# Patient Record
Sex: FEMALE | Race: WHITE | NOT HISPANIC OR LATINO | URBAN - METROPOLITAN AREA
[De-identification: names, ages, dates, MRNs, and addresses within clinical notes are randomized per-mention and may not be internally consistent; named-entity substitution may affect disease eponyms.]

---

## 2017-01-20 ENCOUNTER — OUTPATIENT (OUTPATIENT)
Dept: OUTPATIENT SERVICES | Facility: HOSPITAL | Age: 77
LOS: 1 days | Discharge: HOME | End: 2017-01-20

## 2017-05-17 ENCOUNTER — OUTPATIENT (OUTPATIENT)
Dept: OUTPATIENT SERVICES | Facility: HOSPITAL | Age: 77
LOS: 1 days | Discharge: HOME | End: 2017-05-17

## 2017-06-16 ENCOUNTER — EMERGENCY (EMERGENCY)
Facility: HOSPITAL | Age: 77
LOS: 0 days | Discharge: LEFT BEFORE DISCHARGE | End: 2017-06-16

## 2017-06-16 DIAGNOSIS — F33.2 MAJOR DEPRESSIVE DISORDER, RECURRENT SEVERE WITHOUT PSYCHOTIC FEATURES: ICD-10-CM

## 2017-06-16 DIAGNOSIS — M17.10 UNILATERAL PRIMARY OSTEOARTHRITIS, UNSPECIFIED KNEE: ICD-10-CM

## 2017-06-16 DIAGNOSIS — Z90.49 ACQUIRED ABSENCE OF OTHER SPECIFIED PARTS OF DIGESTIVE TRACT: ICD-10-CM

## 2017-06-16 DIAGNOSIS — E78.00 PURE HYPERCHOLESTEROLEMIA, UNSPECIFIED: ICD-10-CM

## 2017-06-16 DIAGNOSIS — Z88.6 ALLERGY STATUS TO ANALGESIC AGENT: ICD-10-CM

## 2017-06-16 DIAGNOSIS — F41.9 ANXIETY DISORDER, UNSPECIFIED: ICD-10-CM

## 2017-06-16 DIAGNOSIS — I10 ESSENTIAL (PRIMARY) HYPERTENSION: ICD-10-CM

## 2017-06-16 DIAGNOSIS — J44.9 CHRONIC OBSTRUCTIVE PULMONARY DISEASE, UNSPECIFIED: ICD-10-CM

## 2017-06-16 DIAGNOSIS — K21.9 GASTRO-ESOPHAGEAL REFLUX DISEASE WITHOUT ESOPHAGITIS: ICD-10-CM

## 2017-06-16 DIAGNOSIS — R25.1 TREMOR, UNSPECIFIED: ICD-10-CM

## 2017-06-16 DIAGNOSIS — F41.0 PANIC DISORDER [EPISODIC PAROXYSMAL ANXIETY]: ICD-10-CM

## 2017-06-16 DIAGNOSIS — F32.9 MAJOR DEPRESSIVE DISORDER, SINGLE EPISODE, UNSPECIFIED: ICD-10-CM

## 2017-06-16 DIAGNOSIS — Z88.8 ALLERGY STATUS TO OTHER DRUGS, MEDICAMENTS AND BIOLOGICAL SUBSTANCES STATUS: ICD-10-CM

## 2017-06-16 DIAGNOSIS — R07.9 CHEST PAIN, UNSPECIFIED: ICD-10-CM

## 2017-06-16 DIAGNOSIS — G30.9 ALZHEIMER'S DISEASE, UNSPECIFIED: ICD-10-CM

## 2017-06-16 DIAGNOSIS — Z88.0 ALLERGY STATUS TO PENICILLIN: ICD-10-CM

## 2017-06-16 DIAGNOSIS — Z79.899 OTHER LONG TERM (CURRENT) DRUG THERAPY: ICD-10-CM

## 2017-06-28 ENCOUNTER — OUTPATIENT (OUTPATIENT)
Dept: OUTPATIENT SERVICES | Facility: HOSPITAL | Age: 77
LOS: 1 days | Discharge: HOME | End: 2017-06-28

## 2017-06-28 DIAGNOSIS — R07.9 CHEST PAIN, UNSPECIFIED: ICD-10-CM

## 2017-06-28 DIAGNOSIS — F33.1 MAJOR DEPRESSIVE DISORDER, RECURRENT, MODERATE: ICD-10-CM

## 2017-06-28 DIAGNOSIS — M17.10 UNILATERAL PRIMARY OSTEOARTHRITIS, UNSPECIFIED KNEE: ICD-10-CM

## 2017-06-28 DIAGNOSIS — I10 ESSENTIAL (PRIMARY) HYPERTENSION: ICD-10-CM

## 2017-06-28 DIAGNOSIS — K21.9 GASTRO-ESOPHAGEAL REFLUX DISEASE WITHOUT ESOPHAGITIS: ICD-10-CM

## 2017-06-28 DIAGNOSIS — F41.0 PANIC DISORDER [EPISODIC PAROXYSMAL ANXIETY]: ICD-10-CM

## 2017-06-28 DIAGNOSIS — G30.9 ALZHEIMER'S DISEASE, UNSPECIFIED: ICD-10-CM

## 2017-06-28 DIAGNOSIS — F33.2 MAJOR DEPRESSIVE DISORDER, RECURRENT SEVERE WITHOUT PSYCHOTIC FEATURES: ICD-10-CM

## 2017-07-07 ENCOUNTER — EMERGENCY (EMERGENCY)
Facility: HOSPITAL | Age: 77
LOS: 0 days | Discharge: HOME | End: 2017-07-07

## 2017-07-07 DIAGNOSIS — F41.0 PANIC DISORDER [EPISODIC PAROXYSMAL ANXIETY]: ICD-10-CM

## 2017-07-07 DIAGNOSIS — Z96.659 PRESENCE OF UNSPECIFIED ARTIFICIAL KNEE JOINT: ICD-10-CM

## 2017-07-07 DIAGNOSIS — I10 ESSENTIAL (PRIMARY) HYPERTENSION: ICD-10-CM

## 2017-07-07 DIAGNOSIS — R07.2 PRECORDIAL PAIN: ICD-10-CM

## 2017-07-07 DIAGNOSIS — R10.13 EPIGASTRIC PAIN: ICD-10-CM

## 2017-07-07 DIAGNOSIS — G30.9 ALZHEIMER'S DISEASE, UNSPECIFIED: ICD-10-CM

## 2017-07-07 DIAGNOSIS — E78.00 PURE HYPERCHOLESTEROLEMIA, UNSPECIFIED: ICD-10-CM

## 2017-07-07 DIAGNOSIS — F03.90 UNSPECIFIED DEMENTIA, UNSPECIFIED SEVERITY, WITHOUT BEHAVIORAL DISTURBANCE, PSYCHOTIC DISTURBANCE, MOOD DISTURBANCE, AND ANXIETY: ICD-10-CM

## 2017-07-07 DIAGNOSIS — Z88.0 ALLERGY STATUS TO PENICILLIN: ICD-10-CM

## 2017-07-07 DIAGNOSIS — Z90.89 ACQUIRED ABSENCE OF OTHER ORGANS: ICD-10-CM

## 2017-07-07 DIAGNOSIS — Z88.6 ALLERGY STATUS TO ANALGESIC AGENT: ICD-10-CM

## 2017-07-07 DIAGNOSIS — M17.10 UNILATERAL PRIMARY OSTEOARTHRITIS, UNSPECIFIED KNEE: ICD-10-CM

## 2017-07-07 DIAGNOSIS — Z88.5 ALLERGY STATUS TO NARCOTIC AGENT: ICD-10-CM

## 2017-07-07 DIAGNOSIS — R07.9 CHEST PAIN, UNSPECIFIED: ICD-10-CM

## 2017-07-07 DIAGNOSIS — F33.2 MAJOR DEPRESSIVE DISORDER, RECURRENT SEVERE WITHOUT PSYCHOTIC FEATURES: ICD-10-CM

## 2017-07-07 DIAGNOSIS — J44.9 CHRONIC OBSTRUCTIVE PULMONARY DISEASE, UNSPECIFIED: ICD-10-CM

## 2017-07-07 DIAGNOSIS — Z79.899 OTHER LONG TERM (CURRENT) DRUG THERAPY: ICD-10-CM

## 2017-07-07 DIAGNOSIS — K21.9 GASTRO-ESOPHAGEAL REFLUX DISEASE WITHOUT ESOPHAGITIS: ICD-10-CM

## 2017-08-11 ENCOUNTER — OUTPATIENT (OUTPATIENT)
Dept: OUTPATIENT SERVICES | Facility: HOSPITAL | Age: 77
LOS: 1 days | Discharge: HOME | End: 2017-08-11

## 2017-08-11 DIAGNOSIS — F33.1 MAJOR DEPRESSIVE DISORDER, RECURRENT, MODERATE: ICD-10-CM

## 2017-08-14 ENCOUNTER — OUTPATIENT (OUTPATIENT)
Dept: OUTPATIENT SERVICES | Facility: HOSPITAL | Age: 77
LOS: 1 days | Discharge: HOME | End: 2017-08-14

## 2017-08-14 DIAGNOSIS — N31.2 FLACCID NEUROPATHIC BLADDER, NOT ELSEWHERE CLASSIFIED: ICD-10-CM

## 2017-08-14 DIAGNOSIS — F33.2 MAJOR DEPRESSIVE DISORDER, RECURRENT SEVERE WITHOUT PSYCHOTIC FEATURES: ICD-10-CM

## 2017-08-14 DIAGNOSIS — I10 ESSENTIAL (PRIMARY) HYPERTENSION: ICD-10-CM

## 2017-08-14 DIAGNOSIS — M17.10 UNILATERAL PRIMARY OSTEOARTHRITIS, UNSPECIFIED KNEE: ICD-10-CM

## 2017-08-14 DIAGNOSIS — K21.9 GASTRO-ESOPHAGEAL REFLUX DISEASE WITHOUT ESOPHAGITIS: ICD-10-CM

## 2017-08-14 DIAGNOSIS — R07.9 CHEST PAIN, UNSPECIFIED: ICD-10-CM

## 2017-08-14 DIAGNOSIS — G30.9 ALZHEIMER'S DISEASE, UNSPECIFIED: ICD-10-CM

## 2017-08-14 DIAGNOSIS — F41.0 PANIC DISORDER [EPISODIC PAROXYSMAL ANXIETY]: ICD-10-CM

## 2017-08-14 DIAGNOSIS — N39.0 URINARY TRACT INFECTION, SITE NOT SPECIFIED: ICD-10-CM

## 2017-08-14 DIAGNOSIS — M54.5 LOW BACK PAIN: ICD-10-CM

## 2017-08-21 ENCOUNTER — OUTPATIENT (OUTPATIENT)
Dept: OUTPATIENT SERVICES | Facility: HOSPITAL | Age: 77
LOS: 1 days | Discharge: HOME | End: 2017-08-21

## 2017-08-21 DIAGNOSIS — I10 ESSENTIAL (PRIMARY) HYPERTENSION: ICD-10-CM

## 2017-08-21 DIAGNOSIS — K21.9 GASTRO-ESOPHAGEAL REFLUX DISEASE WITHOUT ESOPHAGITIS: ICD-10-CM

## 2017-08-21 DIAGNOSIS — F41.0 PANIC DISORDER [EPISODIC PAROXYSMAL ANXIETY]: ICD-10-CM

## 2017-08-21 DIAGNOSIS — R07.9 CHEST PAIN, UNSPECIFIED: ICD-10-CM

## 2017-08-21 DIAGNOSIS — H90.3 SENSORINEURAL HEARING LOSS, BILATERAL: ICD-10-CM

## 2017-08-21 DIAGNOSIS — H93.11 TINNITUS, RIGHT EAR: ICD-10-CM

## 2017-08-21 DIAGNOSIS — M17.10 UNILATERAL PRIMARY OSTEOARTHRITIS, UNSPECIFIED KNEE: ICD-10-CM

## 2017-08-21 DIAGNOSIS — F33.2 MAJOR DEPRESSIVE DISORDER, RECURRENT SEVERE WITHOUT PSYCHOTIC FEATURES: ICD-10-CM

## 2017-08-21 DIAGNOSIS — G30.9 ALZHEIMER'S DISEASE, UNSPECIFIED: ICD-10-CM

## 2017-09-03 ENCOUNTER — EMERGENCY (EMERGENCY)
Facility: HOSPITAL | Age: 77
LOS: 0 days | Discharge: HOME | End: 2017-09-03

## 2017-09-03 DIAGNOSIS — F41.9 ANXIETY DISORDER, UNSPECIFIED: ICD-10-CM

## 2017-09-03 DIAGNOSIS — M25.551 PAIN IN RIGHT HIP: ICD-10-CM

## 2017-09-03 DIAGNOSIS — K21.9 GASTRO-ESOPHAGEAL REFLUX DISEASE WITHOUT ESOPHAGITIS: ICD-10-CM

## 2017-09-03 DIAGNOSIS — E78.00 PURE HYPERCHOLESTEROLEMIA, UNSPECIFIED: ICD-10-CM

## 2017-09-03 DIAGNOSIS — F33.2 MAJOR DEPRESSIVE DISORDER, RECURRENT SEVERE WITHOUT PSYCHOTIC FEATURES: ICD-10-CM

## 2017-09-03 DIAGNOSIS — G30.9 ALZHEIMER'S DISEASE, UNSPECIFIED: ICD-10-CM
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
## 2017-09-03 DIAGNOSIS — I10 ESSENTIAL (PRIMARY) HYPERTENSION: ICD-10-CM

## 2017-09-03 DIAGNOSIS — R07.89 OTHER CHEST PAIN: ICD-10-CM

## 2017-09-03 DIAGNOSIS — M17.10 UNILATERAL PRIMARY OSTEOARTHRITIS, UNSPECIFIED KNEE: ICD-10-CM

## 2017-09-03 DIAGNOSIS — M54.2 CERVICALGIA: ICD-10-CM

## 2017-09-03 DIAGNOSIS — R07.9 CHEST PAIN, UNSPECIFIED: ICD-10-CM
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
## 2017-09-03 DIAGNOSIS — J44.9 CHRONIC OBSTRUCTIVE PULMONARY DISEASE, UNSPECIFIED: ICD-10-CM

## 2017-09-03 DIAGNOSIS — F41.0 PANIC DISORDER [EPISODIC PAROXYSMAL ANXIETY]: ICD-10-CM
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
## 2017-09-03 DIAGNOSIS — F03.90 UNSPECIFIED DEMENTIA, UNSPECIFIED SEVERITY, WITHOUT BEHAVIORAL DISTURBANCE, PSYCHOTIC DISTURBANCE, MOOD DISTURBANCE, AND ANXIETY: ICD-10-CM

## 2017-09-03 DIAGNOSIS — M25.511 PAIN IN RIGHT SHOULDER: ICD-10-CM

## 2017-10-13 ENCOUNTER — EMERGENCY (EMERGENCY)
Facility: HOSPITAL | Age: 77
LOS: 0 days | Discharge: HOME | End: 2017-10-13

## 2017-10-13 DIAGNOSIS — G30.9 ALZHEIMER'S DISEASE, UNSPECIFIED: ICD-10-CM

## 2017-10-13 DIAGNOSIS — E78.00 PURE HYPERCHOLESTEROLEMIA, UNSPECIFIED: ICD-10-CM

## 2017-10-13 DIAGNOSIS — R06.02 SHORTNESS OF BREATH: ICD-10-CM

## 2017-10-13 DIAGNOSIS — F33.2 MAJOR DEPRESSIVE DISORDER, RECURRENT SEVERE WITHOUT PSYCHOTIC FEATURES: ICD-10-CM

## 2017-10-13 DIAGNOSIS — I10 ESSENTIAL (PRIMARY) HYPERTENSION: ICD-10-CM

## 2017-10-13 DIAGNOSIS — K21.9 GASTRO-ESOPHAGEAL REFLUX DISEASE WITHOUT ESOPHAGITIS: ICD-10-CM

## 2017-10-13 DIAGNOSIS — R07.9 CHEST PAIN, UNSPECIFIED: ICD-10-CM

## 2017-10-13 DIAGNOSIS — F41.0 PANIC DISORDER [EPISODIC PAROXYSMAL ANXIETY]: ICD-10-CM

## 2017-10-13 DIAGNOSIS — Z88.0 ALLERGY STATUS TO PENICILLIN: ICD-10-CM

## 2017-10-13 DIAGNOSIS — Z79.899 OTHER LONG TERM (CURRENT) DRUG THERAPY: ICD-10-CM

## 2017-10-13 DIAGNOSIS — Z88.6 ALLERGY STATUS TO ANALGESIC AGENT: ICD-10-CM

## 2017-10-13 DIAGNOSIS — Z90.49 ACQUIRED ABSENCE OF OTHER SPECIFIED PARTS OF DIGESTIVE TRACT: ICD-10-CM

## 2017-10-13 DIAGNOSIS — J44.9 CHRONIC OBSTRUCTIVE PULMONARY DISEASE, UNSPECIFIED: ICD-10-CM

## 2017-10-13 DIAGNOSIS — F32.9 MAJOR DEPRESSIVE DISORDER, SINGLE EPISODE, UNSPECIFIED: ICD-10-CM

## 2017-10-13 DIAGNOSIS — K20.9 ESOPHAGITIS, UNSPECIFIED: ICD-10-CM

## 2017-10-13 DIAGNOSIS — Z88.5 ALLERGY STATUS TO NARCOTIC AGENT: ICD-10-CM

## 2017-10-13 DIAGNOSIS — R07.81 PLEURODYNIA: ICD-10-CM

## 2017-10-13 DIAGNOSIS — M17.10 UNILATERAL PRIMARY OSTEOARTHRITIS, UNSPECIFIED KNEE: ICD-10-CM

## 2017-11-15 ENCOUNTER — OUTPATIENT (OUTPATIENT)
Dept: OUTPATIENT SERVICES | Facility: HOSPITAL | Age: 77
LOS: 1 days | Discharge: HOME | End: 2017-11-15

## 2017-11-15 DIAGNOSIS — I10 ESSENTIAL (PRIMARY) HYPERTENSION: ICD-10-CM

## 2017-11-15 DIAGNOSIS — E78.5 HYPERLIPIDEMIA, UNSPECIFIED: ICD-10-CM

## 2017-11-15 DIAGNOSIS — E07.9 DISORDER OF THYROID, UNSPECIFIED: ICD-10-CM

## 2017-11-15 DIAGNOSIS — R94.5 ABNORMAL RESULTS OF LIVER FUNCTION STUDIES: ICD-10-CM

## 2017-11-15 DIAGNOSIS — E11.00 TYPE 2 DIABETES MELLITUS WITH HYPEROSMOLARITY WITHOUT NONKETOTIC HYPERGLYCEMIC-HYPEROSMOLAR COMA (NKHHC): ICD-10-CM

## 2017-11-15 DIAGNOSIS — G30.9 ALZHEIMER'S DISEASE, UNSPECIFIED: ICD-10-CM

## 2017-11-15 DIAGNOSIS — R07.9 CHEST PAIN, UNSPECIFIED: ICD-10-CM

## 2017-11-15 DIAGNOSIS — K21.9 GASTRO-ESOPHAGEAL REFLUX DISEASE WITHOUT ESOPHAGITIS: ICD-10-CM

## 2017-11-15 DIAGNOSIS — F41.0 PANIC DISORDER [EPISODIC PAROXYSMAL ANXIETY]: ICD-10-CM

## 2017-11-15 DIAGNOSIS — R31.9 HEMATURIA, UNSPECIFIED: ICD-10-CM

## 2017-11-15 DIAGNOSIS — D50.9 IRON DEFICIENCY ANEMIA, UNSPECIFIED: ICD-10-CM

## 2017-11-15 DIAGNOSIS — E03.9 HYPOTHYROIDISM, UNSPECIFIED: ICD-10-CM

## 2017-11-15 DIAGNOSIS — F33.2 MAJOR DEPRESSIVE DISORDER, RECURRENT SEVERE WITHOUT PSYCHOTIC FEATURES: ICD-10-CM

## 2017-11-15 DIAGNOSIS — R70.0 ELEVATED ERYTHROCYTE SEDIMENTATION RATE: ICD-10-CM

## 2017-11-15 DIAGNOSIS — D64.3 OTHER SIDEROBLASTIC ANEMIAS: ICD-10-CM

## 2017-11-15 DIAGNOSIS — R19.8 OTHER SPECIFIED SYMPTOMS AND SIGNS INVOLVING THE DIGESTIVE SYSTEM AND ABDOMEN: ICD-10-CM

## 2017-11-15 DIAGNOSIS — M17.10 UNILATERAL PRIMARY OSTEOARTHRITIS, UNSPECIFIED KNEE: ICD-10-CM

## 2017-11-15 DIAGNOSIS — E55.9 VITAMIN D DEFICIENCY, UNSPECIFIED: ICD-10-CM

## 2017-11-15 DIAGNOSIS — D51.9 VITAMIN B12 DEFICIENCY ANEMIA, UNSPECIFIED: ICD-10-CM

## 2017-11-18 ENCOUNTER — OUTPATIENT (OUTPATIENT)
Dept: OUTPATIENT SERVICES | Facility: HOSPITAL | Age: 77
LOS: 1 days | Discharge: HOME | End: 2017-11-18

## 2017-11-18 DIAGNOSIS — M54.9 DORSALGIA, UNSPECIFIED: ICD-10-CM

## 2017-11-18 DIAGNOSIS — K21.9 GASTRO-ESOPHAGEAL REFLUX DISEASE WITHOUT ESOPHAGITIS: ICD-10-CM

## 2017-11-18 DIAGNOSIS — F33.2 MAJOR DEPRESSIVE DISORDER, RECURRENT SEVERE WITHOUT PSYCHOTIC FEATURES: ICD-10-CM

## 2017-11-18 DIAGNOSIS — M17.10 UNILATERAL PRIMARY OSTEOARTHRITIS, UNSPECIFIED KNEE: ICD-10-CM

## 2017-11-18 DIAGNOSIS — G30.9 ALZHEIMER'S DISEASE, UNSPECIFIED: ICD-10-CM

## 2017-11-18 DIAGNOSIS — I10 ESSENTIAL (PRIMARY) HYPERTENSION: ICD-10-CM

## 2017-11-18 DIAGNOSIS — F41.0 PANIC DISORDER [EPISODIC PAROXYSMAL ANXIETY]: ICD-10-CM

## 2017-11-18 DIAGNOSIS — R07.9 CHEST PAIN, UNSPECIFIED: ICD-10-CM

## 2017-11-19 ENCOUNTER — OUTPATIENT (OUTPATIENT)
Dept: OUTPATIENT SERVICES | Facility: HOSPITAL | Age: 77
LOS: 1 days | Discharge: HOME | End: 2017-11-19

## 2017-11-19 DIAGNOSIS — G30.9 ALZHEIMER'S DISEASE, UNSPECIFIED: ICD-10-CM

## 2017-11-19 DIAGNOSIS — R10.9 UNSPECIFIED ABDOMINAL PAIN: ICD-10-CM

## 2017-11-19 DIAGNOSIS — K21.9 GASTRO-ESOPHAGEAL REFLUX DISEASE WITHOUT ESOPHAGITIS: ICD-10-CM

## 2017-11-19 DIAGNOSIS — F41.0 PANIC DISORDER [EPISODIC PAROXYSMAL ANXIETY]: ICD-10-CM

## 2017-11-19 DIAGNOSIS — F33.2 MAJOR DEPRESSIVE DISORDER, RECURRENT SEVERE WITHOUT PSYCHOTIC FEATURES: ICD-10-CM

## 2017-11-19 DIAGNOSIS — R10.2 PELVIC AND PERINEAL PAIN: ICD-10-CM

## 2017-11-19 DIAGNOSIS — M17.10 UNILATERAL PRIMARY OSTEOARTHRITIS, UNSPECIFIED KNEE: ICD-10-CM

## 2017-11-19 DIAGNOSIS — I10 ESSENTIAL (PRIMARY) HYPERTENSION: ICD-10-CM

## 2017-11-19 DIAGNOSIS — R07.9 CHEST PAIN, UNSPECIFIED: ICD-10-CM

## 2017-11-26 ENCOUNTER — EMERGENCY (EMERGENCY)
Facility: HOSPITAL | Age: 77
LOS: 0 days | Discharge: LEFT BEFORE DISCHARGE | End: 2017-11-26

## 2017-11-26 DIAGNOSIS — F32.9 MAJOR DEPRESSIVE DISORDER, SINGLE EPISODE, UNSPECIFIED: ICD-10-CM

## 2017-11-26 DIAGNOSIS — I10 ESSENTIAL (PRIMARY) HYPERTENSION: ICD-10-CM

## 2017-11-26 DIAGNOSIS — R10.32 LEFT LOWER QUADRANT PAIN: ICD-10-CM

## 2017-11-26 DIAGNOSIS — R07.9 CHEST PAIN, UNSPECIFIED: ICD-10-CM

## 2017-11-26 DIAGNOSIS — Z88.6 ALLERGY STATUS TO ANALGESIC AGENT: ICD-10-CM

## 2017-11-26 DIAGNOSIS — M17.10 UNILATERAL PRIMARY OSTEOARTHRITIS, UNSPECIFIED KNEE: ICD-10-CM

## 2017-11-26 DIAGNOSIS — G30.9 ALZHEIMER'S DISEASE, UNSPECIFIED: ICD-10-CM

## 2017-11-26 DIAGNOSIS — F41.0 PANIC DISORDER [EPISODIC PAROXYSMAL ANXIETY]: ICD-10-CM

## 2017-11-26 DIAGNOSIS — Z90.49 ACQUIRED ABSENCE OF OTHER SPECIFIED PARTS OF DIGESTIVE TRACT: ICD-10-CM

## 2017-11-26 DIAGNOSIS — F33.2 MAJOR DEPRESSIVE DISORDER, RECURRENT SEVERE WITHOUT PSYCHOTIC FEATURES: ICD-10-CM

## 2017-11-26 DIAGNOSIS — Z79.899 OTHER LONG TERM (CURRENT) DRUG THERAPY: ICD-10-CM

## 2017-11-26 DIAGNOSIS — E78.00 PURE HYPERCHOLESTEROLEMIA, UNSPECIFIED: ICD-10-CM

## 2017-11-26 DIAGNOSIS — K21.9 GASTRO-ESOPHAGEAL REFLUX DISEASE WITHOUT ESOPHAGITIS: ICD-10-CM

## 2017-11-26 DIAGNOSIS — J44.9 CHRONIC OBSTRUCTIVE PULMONARY DISEASE, UNSPECIFIED: ICD-10-CM

## 2017-11-26 DIAGNOSIS — R10.30 LOWER ABDOMINAL PAIN, UNSPECIFIED: ICD-10-CM

## 2017-11-26 DIAGNOSIS — Z88.0 ALLERGY STATUS TO PENICILLIN: ICD-10-CM

## 2017-11-26 DIAGNOSIS — Z88.8 ALLERGY STATUS TO OTHER DRUGS, MEDICAMENTS AND BIOLOGICAL SUBSTANCES: ICD-10-CM

## 2018-01-10 ENCOUNTER — OUTPATIENT (OUTPATIENT)
Dept: OUTPATIENT SERVICES | Facility: HOSPITAL | Age: 78
LOS: 1 days | Discharge: HOME | End: 2018-01-10

## 2018-01-10 DIAGNOSIS — G30.9 ALZHEIMER'S DISEASE, UNSPECIFIED: ICD-10-CM

## 2018-01-10 DIAGNOSIS — F41.0 PANIC DISORDER [EPISODIC PAROXYSMAL ANXIETY]: ICD-10-CM

## 2018-01-10 DIAGNOSIS — K21.9 GASTRO-ESOPHAGEAL REFLUX DISEASE WITHOUT ESOPHAGITIS: ICD-10-CM

## 2018-01-10 DIAGNOSIS — R10.9 UNSPECIFIED ABDOMINAL PAIN: ICD-10-CM

## 2018-01-10 DIAGNOSIS — I10 ESSENTIAL (PRIMARY) HYPERTENSION: ICD-10-CM

## 2018-01-10 DIAGNOSIS — M17.10 UNILATERAL PRIMARY OSTEOARTHRITIS, UNSPECIFIED KNEE: ICD-10-CM

## 2018-01-10 DIAGNOSIS — F33.2 MAJOR DEPRESSIVE DISORDER, RECURRENT SEVERE WITHOUT PSYCHOTIC FEATURES: ICD-10-CM

## 2018-01-10 DIAGNOSIS — R07.9 CHEST PAIN, UNSPECIFIED: ICD-10-CM

## 2018-01-13 ENCOUNTER — OUTPATIENT (OUTPATIENT)
Dept: OUTPATIENT SERVICES | Facility: HOSPITAL | Age: 78
LOS: 1 days | Discharge: HOME | End: 2018-01-13

## 2018-01-13 DIAGNOSIS — K21.9 GASTRO-ESOPHAGEAL REFLUX DISEASE WITHOUT ESOPHAGITIS: ICD-10-CM

## 2018-01-13 DIAGNOSIS — R07.9 CHEST PAIN, UNSPECIFIED: ICD-10-CM

## 2018-01-13 DIAGNOSIS — F33.2 MAJOR DEPRESSIVE DISORDER, RECURRENT SEVERE WITHOUT PSYCHOTIC FEATURES: ICD-10-CM

## 2018-01-13 DIAGNOSIS — G30.9 ALZHEIMER'S DISEASE, UNSPECIFIED: ICD-10-CM

## 2018-01-13 DIAGNOSIS — R10.10 UPPER ABDOMINAL PAIN, UNSPECIFIED: ICD-10-CM

## 2018-01-13 DIAGNOSIS — M17.10 UNILATERAL PRIMARY OSTEOARTHRITIS, UNSPECIFIED KNEE: ICD-10-CM

## 2018-01-13 DIAGNOSIS — I10 ESSENTIAL (PRIMARY) HYPERTENSION: ICD-10-CM

## 2018-01-13 DIAGNOSIS — F41.0 PANIC DISORDER [EPISODIC PAROXYSMAL ANXIETY]: ICD-10-CM

## 2018-02-06 ENCOUNTER — EMERGENCY (EMERGENCY)
Facility: HOSPITAL | Age: 78
LOS: 0 days | Discharge: HOME | End: 2018-02-06

## 2018-02-06 DIAGNOSIS — Z53.21 PROCEDURE AND TREATMENT NOT CARRIED OUT DUE TO PATIENT LEAVING PRIOR TO BEING SEEN BY HEALTH CARE PROVIDER: ICD-10-CM

## 2018-03-10 ENCOUNTER — EMERGENCY (EMERGENCY)
Facility: HOSPITAL | Age: 78
LOS: 0 days | Discharge: HOME | End: 2018-03-10
Attending: STUDENT IN AN ORGANIZED HEALTH CARE EDUCATION/TRAINING PROGRAM

## 2018-03-10 VITALS
OXYGEN SATURATION: 99 % | RESPIRATION RATE: 17 BRPM | SYSTOLIC BLOOD PRESSURE: 158 MMHG | DIASTOLIC BLOOD PRESSURE: 79 MMHG | HEART RATE: 106 BPM | TEMPERATURE: 98 F

## 2018-03-10 VITALS
HEART RATE: 111 BPM | RESPIRATION RATE: 18 BRPM | DIASTOLIC BLOOD PRESSURE: 84 MMHG | SYSTOLIC BLOOD PRESSURE: 145 MMHG | TEMPERATURE: 98 F | OXYGEN SATURATION: 99 %

## 2018-03-10 DIAGNOSIS — Z88.6 ALLERGY STATUS TO ANALGESIC AGENT: ICD-10-CM

## 2018-03-10 DIAGNOSIS — F03.90 UNSPECIFIED DEMENTIA WITHOUT BEHAVIORAL DISTURBANCE: ICD-10-CM

## 2018-03-10 DIAGNOSIS — Z88.0 ALLERGY STATUS TO PENICILLIN: ICD-10-CM

## 2018-03-10 DIAGNOSIS — K57.92 DIVERTICULITIS OF INTESTINE, PART UNSPECIFIED, WITHOUT PERFORATION OR ABSCESS WITHOUT BLEEDING: Chronic | ICD-10-CM

## 2018-03-10 DIAGNOSIS — E78.00 PURE HYPERCHOLESTEROLEMIA, UNSPECIFIED: ICD-10-CM

## 2018-03-10 DIAGNOSIS — K21.9 GASTRO-ESOPHAGEAL REFLUX DISEASE WITHOUT ESOPHAGITIS: ICD-10-CM

## 2018-03-10 DIAGNOSIS — Z79.899 OTHER LONG TERM (CURRENT) DRUG THERAPY: ICD-10-CM

## 2018-03-10 DIAGNOSIS — G30.0 ALZHEIMER'S DISEASE WITH EARLY ONSET: ICD-10-CM

## 2018-03-10 DIAGNOSIS — F32.9 MAJOR DEPRESSIVE DISORDER, SINGLE EPISODE, UNSPECIFIED: ICD-10-CM

## 2018-03-10 DIAGNOSIS — Z96.659 PRESENCE OF UNSPECIFIED ARTIFICIAL KNEE JOINT: ICD-10-CM

## 2018-03-10 DIAGNOSIS — I10 ESSENTIAL (PRIMARY) HYPERTENSION: ICD-10-CM

## 2018-03-10 DIAGNOSIS — T84.82XA FIBROSIS DUE TO INTERNAL ORTHOPEDIC PROSTHETIC DEVICES, IMPLANTS AND GRAFTS, INITIAL ENCOUNTER: Chronic | ICD-10-CM

## 2018-03-10 DIAGNOSIS — R46.2 STRANGE AND INEXPLICABLE BEHAVIOR: ICD-10-CM

## 2018-03-10 LAB
ALBUMIN SERPL ELPH-MCNC: 4.6 G/DL — SIGNIFICANT CHANGE UP (ref 3–5.5)
ALP SERPL-CCNC: 79 U/L — SIGNIFICANT CHANGE UP (ref 30–115)
ALT FLD-CCNC: 13 U/L — SIGNIFICANT CHANGE UP (ref 0–41)
ANION GAP SERPL CALC-SCNC: 9 MMOL/L — SIGNIFICANT CHANGE UP (ref 7–14)
APAP SERPL-MCNC: <10 UG/ML — SIGNIFICANT CHANGE UP (ref 10–30)
APPEARANCE UR: CLEAR — SIGNIFICANT CHANGE UP
AST SERPL-CCNC: 19 U/L — SIGNIFICANT CHANGE UP (ref 0–41)
BACTERIA # UR AUTO: (no result)
BASOPHILS # BLD AUTO: 0.03 K/UL — SIGNIFICANT CHANGE UP (ref 0–0.2)
BASOPHILS NFR BLD AUTO: 0.9 % — SIGNIFICANT CHANGE UP (ref 0–1)
BILIRUB SERPL-MCNC: 0.6 MG/DL — SIGNIFICANT CHANGE UP (ref 0.2–1.2)
BILIRUB UR-MCNC: NEGATIVE — SIGNIFICANT CHANGE UP
BUN SERPL-MCNC: 17 MG/DL — SIGNIFICANT CHANGE UP (ref 10–20)
CALCIUM SERPL-MCNC: 10.2 MG/DL — HIGH (ref 8.5–10.1)
CHLORIDE SERPL-SCNC: 103 MMOL/L — SIGNIFICANT CHANGE UP (ref 98–110)
CK MB BLD-MCNC: 2 % — SIGNIFICANT CHANGE UP (ref 0–4)
CK MB CFR SERPL CALC: 1.4 NG/ML — SIGNIFICANT CHANGE UP (ref 0.6–6.3)
CK SERPL-CCNC: 65 U/L — SIGNIFICANT CHANGE UP (ref 0–225)
CO2 SERPL-SCNC: 28 MMOL/L — SIGNIFICANT CHANGE UP (ref 17–32)
COLOR SPEC: YELLOW — SIGNIFICANT CHANGE UP
CREAT SERPL-MCNC: 0.8 MG/DL — SIGNIFICANT CHANGE UP (ref 0.7–1.5)
DIFF PNL FLD: (no result)
EOSINOPHIL # BLD AUTO: 0.03 K/UL — SIGNIFICANT CHANGE UP (ref 0–0.7)
EOSINOPHIL NFR BLD AUTO: 0.9 % — SIGNIFICANT CHANGE UP (ref 0–8)
EPI CELLS # UR: (no result) /HPF
ETHANOL SERPL-MCNC: <5 MG/DL — SIGNIFICANT CHANGE UP
GLUCOSE SERPL-MCNC: 111 MG/DL — HIGH (ref 70–110)
GLUCOSE UR QL: NEGATIVE MG/DL — SIGNIFICANT CHANGE UP
HCT VFR BLD CALC: 42.4 % — SIGNIFICANT CHANGE UP (ref 37–47)
HGB BLD-MCNC: 13.3 G/DL — SIGNIFICANT CHANGE UP (ref 12–16)
IMM GRANULOCYTES NFR BLD AUTO: 0.3 % — SIGNIFICANT CHANGE UP (ref 0.1–0.3)
KETONES UR-MCNC: (no result)
LEUKOCYTE ESTERASE UR-ACNC: (no result)
LIDOCAIN IGE QN: 34 U/L — SIGNIFICANT CHANGE UP (ref 7–60)
LYMPHOCYTES # BLD AUTO: 0.73 K/UL — LOW (ref 1.2–3.4)
LYMPHOCYTES # BLD AUTO: 20.8 % — SIGNIFICANT CHANGE UP (ref 20.5–51.1)
MCHC RBC-ENTMCNC: 27.9 PG — SIGNIFICANT CHANGE UP (ref 27–31)
MCHC RBC-ENTMCNC: 31.4 G/DL — LOW (ref 32–37)
MCV RBC AUTO: 89.1 FL — SIGNIFICANT CHANGE UP (ref 81–99)
MONOCYTES # BLD AUTO: 0.37 K/UL — SIGNIFICANT CHANGE UP (ref 0.1–0.6)
MONOCYTES NFR BLD AUTO: 10.5 % — HIGH (ref 1.7–9.3)
NEUTROPHILS # BLD AUTO: 2.34 K/UL — SIGNIFICANT CHANGE UP (ref 1.4–6.5)
NEUTROPHILS NFR BLD AUTO: 66.6 % — SIGNIFICANT CHANGE UP (ref 42.2–75.2)
NITRITE UR-MCNC: NEGATIVE — SIGNIFICANT CHANGE UP
NRBC # BLD: 0 /100 WBCS — SIGNIFICANT CHANGE UP (ref 0–0)
PH UR: 5.5 — SIGNIFICANT CHANGE UP (ref 5–8)
PLATELET # BLD AUTO: 313 K/UL — SIGNIFICANT CHANGE UP (ref 130–400)
POTASSIUM SERPL-MCNC: 4 MMOL/L — SIGNIFICANT CHANGE UP (ref 3.5–5)
POTASSIUM SERPL-SCNC: 4 MMOL/L — SIGNIFICANT CHANGE UP (ref 3.5–5)
PROT SERPL-MCNC: 7.9 G/DL — SIGNIFICANT CHANGE UP (ref 6–8)
PROT UR-MCNC: 30 MG/DL
RBC # BLD: 4.76 M/UL — SIGNIFICANT CHANGE UP (ref 4.2–5.4)
RBC # FLD: 14.4 % — SIGNIFICANT CHANGE UP (ref 11.5–14.5)
RBC CASTS # UR COMP ASSIST: (no result) /HPF
SALICYLATES SERPL-MCNC: <4 MG/DL — SIGNIFICANT CHANGE UP (ref 4–30)
SODIUM SERPL-SCNC: 140 MMOL/L — SIGNIFICANT CHANGE UP (ref 135–146)
SP GR SPEC: 1.02 — SIGNIFICANT CHANGE UP (ref 1.01–1.03)
TROPONIN I SERPL-MCNC: <0.02 NG/ML — SIGNIFICANT CHANGE UP (ref 0–0.05)
UROBILINOGEN FLD QL: 0.2 MG/DL — SIGNIFICANT CHANGE UP (ref 0.2–0.2)
WBC # BLD: 3.51 K/UL — LOW (ref 4.8–10.8)
WBC # FLD AUTO: 3.51 K/UL — LOW (ref 4.8–10.8)
WBC UR QL: SIGNIFICANT CHANGE UP /HPF

## 2018-03-10 RX ORDER — SODIUM CHLORIDE 9 MG/ML
1000 INJECTION INTRAMUSCULAR; INTRAVENOUS; SUBCUTANEOUS ONCE
Qty: 0 | Refills: 0 | Status: COMPLETED | OUTPATIENT
Start: 2018-03-10 | End: 2018-03-10

## 2018-03-10 RX ORDER — SODIUM CHLORIDE 9 MG/ML
3 INJECTION INTRAMUSCULAR; INTRAVENOUS; SUBCUTANEOUS ONCE
Qty: 0 | Refills: 0 | Status: COMPLETED | OUTPATIENT
Start: 2018-03-10 | End: 2018-03-10

## 2018-03-10 RX ORDER — MEMANTINE HYDROCHLORIDE 10 MG/1
1 TABLET ORAL
Qty: 0 | Refills: 0 | COMMUNITY

## 2018-03-10 RX ADMIN — SODIUM CHLORIDE 1000 MILLILITER(S): 9 INJECTION INTRAMUSCULAR; INTRAVENOUS; SUBCUTANEOUS at 09:39

## 2018-03-10 RX ADMIN — Medication 1 MILLIGRAM(S): at 09:23

## 2018-03-10 RX ADMIN — SODIUM CHLORIDE 3 MILLILITER(S): 9 INJECTION INTRAMUSCULAR; INTRAVENOUS; SUBCUTANEOUS at 08:29

## 2018-03-10 NOTE — ED BEHAVIORAL HEALTH ASSESSMENT NOTE - DESCRIPTION
patient sitting or lying  on bed, requesting to go home, Pt is somewhat restless and redirectable fro short periods of time. responding to verbal reassurance briefly and then returning  to the same question. see medical note 76y/o w/f lives c live in . Pt has two dtrs and a sister who are involved with her care

## 2018-03-10 NOTE — ED BEHAVIORAL HEALTH ASSESSMENT NOTE - HPI (INCLUDE ILLNESS QUALITY, SEVERITY, DURATION, TIMING, CONTEXT, MODIFYING FACTORS, ASSOCIATED SIGNS AND SYMPTOMS)
78y/o female with a pmhx of HTN, HLD, Anxiety, depression, dementia, GERD, presents today with boyfriend for psych eval. Pt's boyfriend notes that she has been acting more confused over the past 1 week. For example, he notes that this morning she was getting in and out of bed multiple times for no apparent reason. No acute medical problems identified.   Pt has been in [facility] geriatric psychiatrist c Dr. Jones , but has not seen her or the therapist in many months.

## 2018-03-10 NOTE — ED BEHAVIORAL HEALTH ASSESSMENT NOTE - REFERRAL / APPOINTMENT DETAILS
1. Pt to return to treating psychiatrist Dr Jones 2. Pt obtain home health aide 3. Pt to find and attend day program/senior groups or similar for 7d/wk.

## 2018-03-10 NOTE — ED BEHAVIORAL HEALTH ASSESSMENT NOTE - MEDICATIONS (PRESCRIPTIONS, DIRECTIONS)
Continue current meds, occasional extra dose exa to be given for sever eagitation. Pt to return to treating psychiatrist Dr Jones

## 2018-03-10 NOTE — ED BEHAVIORAL HEALTH ASSESSMENT NOTE - SAFETY PLAN DETAILS
Pt to return to treating psychiatrist Dr Jones. [facility]

## 2018-03-10 NOTE — ED BEHAVIORAL HEALTH ASSESSMENT NOTE - RISK ASSESSMENT
no si/hi. Live in BF provides care and support to pt. Live in bf appers to to be overburdened by pt 's needs. Pt and care taker would greatly benefit form a home health aide being assigned to the pt.

## 2018-03-10 NOTE — ED PROVIDER NOTE - PHYSICAL EXAMINATION
VITAL SIGNS: I have reviewed nursing notes and confirm.  CONSTITUTIONAL: Well-developed; well-nourished; in no acute distress.   SKIN: skin exam is warm and dry, no acute rash.    HEAD: Normocephalic; atraumatic.  EYES: conjunctiva and sclera clear.  ENT: No nasal discharge; airway clear.  NECK: Supple; non tender.  CARD: S1, S2 normal; no murmurs, gallops, or rubs. Regular rate and rhythm.   RESP: No wheezes, rales or rhonchi.  EXT: Normal ROM.  No clubbing, cyanosis or edema.   NEURO: ambulating well, steady gait, muscle strength 5/5 throughout, Alert, oriented, grossly unremarkable

## 2018-03-10 NOTE — ED BEHAVIORAL HEALTH ASSESSMENT NOTE - SUMMARY
76y/o female with a pmhx of HTN, HLD, Anxiety, depression, dementia, GERD, presents today with boyfriend for psych eval. Pt's boyfriend notes that she has been acting more confused over the past 1 week. For example, he notes that this morning she was getting in and out of bed multiple times for no apparent reason. No acute medical problems identified. Pt has been in [facility] geriatric psychiatrist c Dr. Jones , but has not seen her or the therapist in many months.    Upon exam pt presents c moderate-severe dementia, some agitation and restlessness. Pt I snot depressed, manic or psychotic. Pt presents c symptoms consistent  with agitation secondary to dementia.

## 2018-03-10 NOTE — ED PROVIDER NOTE - OBJECTIVE STATEMENT
Pt is a 78y/o female with a pmhx of HTN, HLD, Anxiety, depression, Mild dementia, GERD, presents today with boyfriend for psych eval. Pt's boyfriend notes that she has been acting more confused ove rthe past 1 week. For example, he notes that this morning she was getting in and out of bed multiple times for no apparent reason. Pt Pt is a 78y/o female with a pmhx of HTN, HLD, Anxiety, depression, Mild dementia, GERD, presents today with boyfriend for psych eval. Pt's boyfriend notes that she has been acting more confused ove rthe past 1 week. For example, he notes that this morning she was getting in and out of bed multiple times for no apparent reason. Pt denies Abd pain, n/v/d, fever, chills, cough, dysuria, hematuria, head trauma, weakness, paresthesias, numbness.

## 2018-03-10 NOTE — ED PROVIDER NOTE - ATTENDING CONTRIBUTION TO CARE
78 y/o F pmh above, anxiety, depression on klonipin, olanzapine, p/w increasing confusion, agitation for past few months, worse x 1 wk.  No fever, trauma, cp, sob, abd pain v/d.  No HI SI Hallucination.  PE as noted.  Pt is anxious, but cooperative.  P: labs, ekg, psyche consult, reassess.

## 2018-03-11 LAB
CULTURE RESULTS: SIGNIFICANT CHANGE UP
SPECIMEN SOURCE: SIGNIFICANT CHANGE UP

## 2018-03-22 ENCOUNTER — INPATIENT (INPATIENT)
Facility: HOSPITAL | Age: 78
LOS: 10 days | Discharge: NURSING, REHAB OR LONG-TERM CARE | End: 2018-04-02
Attending: INTERNAL MEDICINE

## 2018-03-22 VITALS
SYSTOLIC BLOOD PRESSURE: 112 MMHG | RESPIRATION RATE: 16 BRPM | HEART RATE: 78 BPM | DIASTOLIC BLOOD PRESSURE: 61 MMHG | OXYGEN SATURATION: 96 %

## 2018-03-22 DIAGNOSIS — Z98.890 OTHER SPECIFIED POSTPROCEDURAL STATES: Chronic | ICD-10-CM

## 2018-03-22 DIAGNOSIS — K57.92 DIVERTICULITIS OF INTESTINE, PART UNSPECIFIED, WITHOUT PERFORATION OR ABSCESS WITHOUT BLEEDING: Chronic | ICD-10-CM

## 2018-03-22 DIAGNOSIS — G30.9 ALZHEIMER'S DISEASE, UNSPECIFIED: ICD-10-CM

## 2018-03-22 DIAGNOSIS — T84.82XA FIBROSIS DUE TO INTERNAL ORTHOPEDIC PROSTHETIC DEVICES, IMPLANTS AND GRAFTS, INITIAL ENCOUNTER: Chronic | ICD-10-CM

## 2018-03-22 LAB
ALBUMIN SERPL ELPH-MCNC: 4.3 G/DL — SIGNIFICANT CHANGE UP (ref 3.5–5.2)
ALP SERPL-CCNC: 70 U/L — SIGNIFICANT CHANGE UP (ref 30–115)
ALT FLD-CCNC: 13 U/L — SIGNIFICANT CHANGE UP (ref 0–41)
ANION GAP SERPL CALC-SCNC: 18 MMOL/L — HIGH (ref 7–14)
APAP SERPL-MCNC: <5 UG/ML — LOW (ref 10–30)
APPEARANCE UR: (no result)
APTT BLD: 22.1 SEC — CRITICAL LOW (ref 27–39.2)
AST SERPL-CCNC: 21 U/L — SIGNIFICANT CHANGE UP (ref 0–41)
BASOPHILS # BLD AUTO: 0.02 K/UL — SIGNIFICANT CHANGE UP (ref 0–0.2)
BASOPHILS NFR BLD AUTO: 0.4 % — SIGNIFICANT CHANGE UP (ref 0–1)
BILIRUB SERPL-MCNC: 0.5 MG/DL — SIGNIFICANT CHANGE UP (ref 0.2–1.2)
BILIRUB UR-MCNC: NEGATIVE — SIGNIFICANT CHANGE UP
BUN SERPL-MCNC: 30 MG/DL — HIGH (ref 10–20)
CALCIUM SERPL-MCNC: 9.4 MG/DL — SIGNIFICANT CHANGE UP (ref 8.5–10.1)
CHLORIDE SERPL-SCNC: 103 MMOL/L — SIGNIFICANT CHANGE UP (ref 98–110)
CK SERPL-CCNC: 334 U/L — HIGH (ref 0–225)
CO2 SERPL-SCNC: 25 MMOL/L — SIGNIFICANT CHANGE UP (ref 17–32)
COLOR SPEC: SIGNIFICANT CHANGE UP
CREAT SERPL-MCNC: 1 MG/DL — SIGNIFICANT CHANGE UP (ref 0.7–1.5)
DIFF PNL FLD: NEGATIVE — SIGNIFICANT CHANGE UP
EOSINOPHIL # BLD AUTO: 0 K/UL — SIGNIFICANT CHANGE UP (ref 0–0.7)
EOSINOPHIL NFR BLD AUTO: 0 % — SIGNIFICANT CHANGE UP (ref 0–8)
ETHANOL SERPL-MCNC: <10 MG/DL — HIGH
GAS PNL BLDV: SIGNIFICANT CHANGE UP
GLUCOSE SERPL-MCNC: 162 MG/DL — HIGH (ref 70–110)
GLUCOSE UR QL: NEGATIVE — SIGNIFICANT CHANGE UP
HCT VFR BLD CALC: 36.1 % — LOW (ref 37–47)
HGB BLD-MCNC: 11.7 G/DL — LOW (ref 12–16)
IMM GRANULOCYTES NFR BLD AUTO: 0.4 % — HIGH (ref 0.1–0.3)
INR BLD: 1.08 RATIO — SIGNIFICANT CHANGE UP (ref 0.65–1.3)
KETONES UR-MCNC: NEGATIVE — SIGNIFICANT CHANGE UP
LEUKOCYTE ESTERASE UR-ACNC: NEGATIVE — SIGNIFICANT CHANGE UP
LYMPHOCYTES # BLD AUTO: 0.79 K/UL — LOW (ref 1.2–3.4)
LYMPHOCYTES # BLD AUTO: 15.4 % — LOW (ref 20.5–51.1)
MCHC RBC-ENTMCNC: 28.3 PG — SIGNIFICANT CHANGE UP (ref 27–31)
MCHC RBC-ENTMCNC: 32.4 G/DL — SIGNIFICANT CHANGE UP (ref 32–37)
MCV RBC AUTO: 87.2 FL — SIGNIFICANT CHANGE UP (ref 81–99)
MONOCYTES # BLD AUTO: 0.43 K/UL — SIGNIFICANT CHANGE UP (ref 0.1–0.6)
MONOCYTES NFR BLD AUTO: 8.4 % — SIGNIFICANT CHANGE UP (ref 1.7–9.3)
NEUTROPHILS # BLD AUTO: 3.86 K/UL — SIGNIFICANT CHANGE UP (ref 1.4–6.5)
NEUTROPHILS NFR BLD AUTO: 75.4 % — HIGH (ref 42.2–75.2)
NITRITE UR-MCNC: NEGATIVE — SIGNIFICANT CHANGE UP
PH UR: 6.5 — SIGNIFICANT CHANGE UP (ref 5–8)
PLATELET # BLD AUTO: 316 K/UL — SIGNIFICANT CHANGE UP (ref 130–400)
POTASSIUM SERPL-MCNC: 3.7 MMOL/L — SIGNIFICANT CHANGE UP (ref 3.5–5)
POTASSIUM SERPL-SCNC: 3.7 MMOL/L — SIGNIFICANT CHANGE UP (ref 3.5–5)
PROT SERPL-MCNC: 6.7 G/DL — SIGNIFICANT CHANGE UP (ref 6–8)
PROT UR-MCNC: >=300
PROTHROM AB SERPL-ACNC: 11.7 SEC — SIGNIFICANT CHANGE UP (ref 9.95–12.87)
RBC # BLD: 4.14 M/UL — LOW (ref 4.2–5.4)
RBC # FLD: 14.6 % — HIGH (ref 11.5–14.5)
SALICYLATES SERPL-MCNC: <0.3 MG/DL — LOW (ref 4–30)
SODIUM SERPL-SCNC: 146 MMOL/L — SIGNIFICANT CHANGE UP (ref 135–146)
SP GR SPEC: >=1.035 (ref 1.01–1.03)
TROPONIN T SERPL-MCNC: <0.01 NG/ML — SIGNIFICANT CHANGE UP
UROBILINOGEN FLD QL: 0.2 — SIGNIFICANT CHANGE UP (ref 0.2–0.2)
WBC # BLD: 5.12 K/UL — SIGNIFICANT CHANGE UP (ref 4.8–10.8)
WBC # FLD AUTO: 5.12 K/UL — SIGNIFICANT CHANGE UP (ref 4.8–10.8)

## 2018-03-22 RX ORDER — DONEPEZIL HYDROCHLORIDE 10 MG/1
10 TABLET, FILM COATED ORAL AT BEDTIME
Qty: 0 | Refills: 0 | Status: DISCONTINUED | OUTPATIENT
Start: 2018-03-22 | End: 2018-04-02

## 2018-03-22 RX ORDER — TOLTERODINE TARTRATE 1 MG/1
1 TABLET, FILM COATED ORAL
Qty: 0 | Refills: 0 | COMMUNITY

## 2018-03-22 RX ORDER — MEMANTINE HYDROCHLORIDE AND DONEPEZIL HYDROCHLORIDE 21; 10 MG/1; MG/1
1 CAPSULE ORAL
Qty: 0 | Refills: 0 | COMMUNITY

## 2018-03-22 RX ORDER — MEMANTINE HYDROCHLORIDE 10 MG/1
28 TABLET ORAL DAILY
Qty: 0 | Refills: 0 | Status: DISCONTINUED | OUTPATIENT
Start: 2018-03-22 | End: 2018-03-22

## 2018-03-22 RX ORDER — DULOXETINE HYDROCHLORIDE 30 MG/1
1 CAPSULE, DELAYED RELEASE ORAL
Qty: 0 | Refills: 0 | COMMUNITY

## 2018-03-22 RX ORDER — OLANZAPINE 15 MG/1
1 TABLET, FILM COATED ORAL
Qty: 0 | Refills: 0 | COMMUNITY

## 2018-03-22 RX ORDER — GABAPENTIN 400 MG/1
100 CAPSULE ORAL THREE TIMES A DAY
Qty: 0 | Refills: 0 | Status: DISCONTINUED | OUTPATIENT
Start: 2018-03-22 | End: 2018-04-02

## 2018-03-22 RX ORDER — DULOXETINE HYDROCHLORIDE 30 MG/1
60 CAPSULE, DELAYED RELEASE ORAL DAILY
Qty: 0 | Refills: 0 | Status: DISCONTINUED | OUTPATIENT
Start: 2018-03-22 | End: 2018-04-02

## 2018-03-22 RX ORDER — HYDROXYZINE HCL 10 MG
1 TABLET ORAL
Qty: 0 | Refills: 0 | COMMUNITY

## 2018-03-22 RX ORDER — HEPARIN SODIUM 5000 [USP'U]/ML
5000 INJECTION INTRAVENOUS; SUBCUTANEOUS EVERY 8 HOURS
Qty: 0 | Refills: 0 | Status: DISCONTINUED | OUTPATIENT
Start: 2018-03-22 | End: 2018-04-02

## 2018-03-22 RX ORDER — OLANZAPINE 15 MG/1
7.5 TABLET, FILM COATED ORAL AT BEDTIME
Qty: 0 | Refills: 0 | Status: DISCONTINUED | OUTPATIENT
Start: 2018-03-22 | End: 2018-04-02

## 2018-03-22 RX ORDER — GABAPENTIN 400 MG/1
1 CAPSULE ORAL
Qty: 0 | Refills: 0 | COMMUNITY

## 2018-03-22 RX ORDER — ATORVASTATIN CALCIUM 80 MG/1
1 TABLET, FILM COATED ORAL
Qty: 0 | Refills: 0 | COMMUNITY

## 2018-03-22 RX ORDER — AMLODIPINE BESYLATE 2.5 MG/1
10 TABLET ORAL DAILY
Qty: 0 | Refills: 0 | Status: DISCONTINUED | OUTPATIENT
Start: 2018-03-22 | End: 2018-03-24

## 2018-03-22 RX ORDER — AMLODIPINE BESYLATE AND BENAZEPRIL HYDROCHLORIDE 10; 20 MG/1; MG/1
1 CAPSULE ORAL
Qty: 0 | Refills: 0 | COMMUNITY

## 2018-03-22 RX ORDER — TOLTERODINE TARTRATE 1 MG/1
0 TABLET, FILM COATED ORAL
Qty: 0 | Refills: 0 | COMMUNITY

## 2018-03-22 RX ORDER — MEMANTINE HYDROCHLORIDE 10 MG/1
10 TABLET ORAL
Qty: 0 | Refills: 0 | Status: DISCONTINUED | OUTPATIENT
Start: 2018-03-22 | End: 2018-04-02

## 2018-03-22 RX ORDER — SODIUM CHLORIDE 9 MG/ML
1000 INJECTION, SOLUTION INTRAVENOUS
Qty: 0 | Refills: 0 | Status: DISCONTINUED | OUTPATIENT
Start: 2018-03-22 | End: 2018-03-24

## 2018-03-22 RX ORDER — OMEPRAZOLE 10 MG/1
1 CAPSULE, DELAYED RELEASE ORAL
Qty: 0 | Refills: 0 | COMMUNITY

## 2018-03-22 RX ORDER — HYDROXYZINE HCL 10 MG
25 TABLET ORAL EVERY 6 HOURS
Qty: 0 | Refills: 0 | Status: DISCONTINUED | OUTPATIENT
Start: 2018-03-22 | End: 2018-04-02

## 2018-03-22 RX ORDER — LISINOPRIL 2.5 MG/1
20 TABLET ORAL DAILY
Qty: 0 | Refills: 0 | Status: DISCONTINUED | OUTPATIENT
Start: 2018-03-22 | End: 2018-03-24

## 2018-03-22 RX ORDER — HYDROXYZINE HCL 10 MG
25 TABLET ORAL
Qty: 0 | Refills: 0 | Status: DISCONTINUED | OUTPATIENT
Start: 2018-03-22 | End: 2018-03-22

## 2018-03-22 RX ORDER — ATORVASTATIN CALCIUM 80 MG/1
80 TABLET, FILM COATED ORAL AT BEDTIME
Qty: 0 | Refills: 0 | Status: DISCONTINUED | OUTPATIENT
Start: 2018-03-22 | End: 2018-04-02

## 2018-03-22 RX ORDER — ACETAMINOPHEN 500 MG
650 TABLET ORAL EVERY 6 HOURS
Qty: 0 | Refills: 0 | Status: DISCONTINUED | OUTPATIENT
Start: 2018-03-22 | End: 2018-04-02

## 2018-03-22 RX ORDER — CLONAZEPAM 1 MG
0.5 TABLET ORAL EVERY 6 HOURS
Qty: 0 | Refills: 0 | Status: DISCONTINUED | OUTPATIENT
Start: 2018-03-22 | End: 2018-03-30

## 2018-03-22 RX ORDER — PANTOPRAZOLE SODIUM 20 MG/1
40 TABLET, DELAYED RELEASE ORAL
Qty: 0 | Refills: 0 | Status: DISCONTINUED | OUTPATIENT
Start: 2018-03-22 | End: 2018-04-02

## 2018-03-22 RX ORDER — CLONAZEPAM 1 MG
0 TABLET ORAL
Qty: 0 | Refills: 0 | COMMUNITY

## 2018-03-22 RX ADMIN — GABAPENTIN 100 MILLIGRAM(S): 400 CAPSULE ORAL at 22:02

## 2018-03-22 RX ADMIN — OLANZAPINE 7.5 MILLIGRAM(S): 15 TABLET, FILM COATED ORAL at 22:02

## 2018-03-22 RX ADMIN — SODIUM CHLORIDE 75 MILLILITER(S): 9 INJECTION, SOLUTION INTRAVENOUS at 19:06

## 2018-03-22 RX ADMIN — Medication 25 MILLIGRAM(S): at 22:10

## 2018-03-22 RX ADMIN — DONEPEZIL HYDROCHLORIDE 10 MILLIGRAM(S): 10 TABLET, FILM COATED ORAL at 22:02

## 2018-03-22 RX ADMIN — ATORVASTATIN CALCIUM 80 MILLIGRAM(S): 80 TABLET, FILM COATED ORAL at 22:02

## 2018-03-22 RX ADMIN — HEPARIN SODIUM 5000 UNIT(S): 5000 INJECTION INTRAVENOUS; SUBCUTANEOUS at 22:06

## 2018-03-22 NOTE — ED BEHAVIORAL HEALTH ASSESSMENT NOTE - CASE SUMMARY
This is a woman with a h/o alzheimers who appears to have had an erroneous, recent diagnosis of schizophrenia who received a medical work up for mutism that has been thus far negative.  There is no indication for psychiatric treatment by history, collateral, or on mental status exam at this time.

## 2018-03-22 NOTE — ED PROVIDER NOTE - OBJECTIVE STATEMENT
77 F pmh htn, hld, schizophrenia, alzheimer's bibems for decreased responsiveness this am per boyfriend.  patient was last seen well when she got up to use the restroom this morning at 5am. boyfriend told ems he was not speaking to her this morning.   patient awake and alert, following commands but not answering questions. patient only requested water.

## 2018-03-22 NOTE — ED PROVIDER NOTE - PROGRESS NOTE DETAILS
pt requesting to urinate, now sitting on bedpan Just prior, when pt had returned from CT, requested water, o/w not speaking, VS remain NL.  awake, alert. Psych resident at bedside evaluating patient

## 2018-03-22 NOTE — H&P ADULT - PMH
COPD (chronic obstructive pulmonary disease)    Dementia    Diverticulitis    GERD (gastroesophageal reflux disease)    High blood pressure    Hypercholesterolemia

## 2018-03-22 NOTE — ED BEHAVIORAL HEALTH ASSESSMENT NOTE - HPI (INCLUDE ILLNESS QUALITY, SEVERITY, DURATION, TIMING, CONTEXT, MODIFYING FACTORS, ASSOCIATED SIGNS AND SYMPTOMS)
76 yo  female, domiciled with boyfriend, with  hx of dementia, PMH of DM, HLD, HTN brought in by boyfriend from home for change in mental status. Medical workup was negative in ED, Psych service consulted for eval. In ED, pt is seen sitting in bed with her boyfriend and sister at bedside. Pt is a poor historian 2/2 memory loss, requiring frequent reorientation and multiple prompts to questions, and frequently responding with "I'm not comfortable", "I want to sit up". 76 yo  female, domiciled with boyfriend, with  hx of Alzheimer's disease, PMH of HTN and DLD brought in by boyfriend from home for change in mental status. Medical workup was negative in ED, Psych service consulted for eval. In ED, pt is seen sitting in bed with her boyfriend and sister at bedside. Pt is a poor historian 2/2 memory loss, requiring frequent reorientation and multiple prompts to questions, and frequently responding with "I'm not comfortable", "I want to sit up".    Per pt's sister and boyfriend at bedside, pt was diagnosed with Alzheimer's disease by her PMD Dr Falk a few months ago. For the past 2-3 months, pt has been increasingly forgetful, "not herself", with disrupted sleep, wandering out of the house, and worsening ADLs. Pt's boyfriend and sister (who lives in a different house but is involved in her care) do not feel comfortable taking care of pt at home and brought her to the ED for recs on higher level of care. Of note, pt was seen in the ED at [facility] on Saturday, where she was discharged with a diagnosis of schizophrenia. Per pt's boyfriend and sister, pt has no prior history of psychotic symptoms.

## 2018-03-22 NOTE — H&P ADULT - NSHPLABSRESULTS_GEN_ALL_CORE
CARDIAC MARKERS ( 22 Mar 2018 11:31 )  x     / <0.01 ng/mL / 334 U/L / x     / 6.7 ng/mL               11.7   5.12  )-----------( 316      ( 22 Mar 2018 11:31 )             36.1   03-22  146  |  103  |  30<H>  ----------------------------<  162<H>  3.7   |  25  |  1.0  Ca    9.4      22 Mar 2018 11:31  TPro  6.7  /  Alb  4.3  /  TBili  0.5  /  DBili  x   /  AST  21  /  ALT  13  /  AlkPhos  70  03-22  LIVER FUNCTIONS - ( 22 Mar 2018 11:31 )  Alb: 4.3 g/dL / Pro: 6.7 g/dL / ALK PHOS: 70 U/L / ALT: 13 U/L / AST: 21 U/L / GGT: x         PT/INR - ( 22 Mar 2018 11:31 )   PT: 11.70 sec;   INR: 1.08 ratio     PTT - ( 22 Mar 2018 11:31 )  PTT:22.1 sec    Urinalysis Basic - ( 22 Mar 2018 11:32 )  Color: Dark Yellow / Appearance: Slightly Cloudy / SG: >=1.035 / pH: x  Gluc: x / Ketone: Negative  / Bili: Negative / Urobili: 0.2   Blood: x / Protein: >=300 / Nitrite: Negative   Leuk Esterase: Negative / RBC: x / WBC x   Sq Epi: x / Non Sq Epi: Few /HPF / Bacteria: x    .Urine Clean Catch (Midstream)  03-10-18   Culture grew 3 or more types of organisms which indicate  collection contamination; consider recollection only if clinically  indicated.

## 2018-03-22 NOTE — H&P ADULT - NSHPREVIEWOFSYSTEMS_GEN_ALL_CORE
General: denies fevers, chills, pain, she states she feels the same now as   Cardiac: denies chest pain  Lungs: denies breathing problems  Abd: denies abd pain, states she is eating and drinking, which is contradictory to what boyfriend said, denies bleeding  Neuro: no headaches, vision changes, focal weakness

## 2018-03-22 NOTE — ED PROVIDER NOTE - CARE PLAN
Principal Discharge DX:	Weakness  Secondary Diagnosis:	Dementia  Secondary Diagnosis:	Alzheimers disease

## 2018-03-22 NOTE — H&P ADULT - HISTORY OF PRESENT ILLNESS
78 y/o F with PMHx hypertension, diverticulitis, DLD, COPD, GERD, Alzheimers dementia, PSHx colon resection, CCY, brought in by live in boyfriend Steven for worsening mental status and inability to take care of pt. As per phone discussion with Steven, he does all the cooking and cleaning at home, but the pt is becoming more and more difficult to take care of. In the past 3 days she has eaten only 1 egg, sleeps during the daytime and keeps him up throughout the night time, makes less and less sense. All this has been rapidly worsening for past 2 weeks, but in the bigger picture has been going on for 2 months prior. Pt was here in ED on 3/10/2018, brought in by boyfriend for increasing confusion x1 week. She was seen by Psych at that time (Dr Jones), who stated she had moderate to severe dementia in his note, and was d/c home from the ER with outpt followup. Pt herself upon interview is AAOx2, very slow to answer questions, but can follow basic commands. She does not know why she is here, her medical conditions, but denies any complaints/pains.

## 2018-03-22 NOTE — ED BEHAVIORAL HEALTH ASSESSMENT NOTE - SUMMARY
76 yo  female with  hx of Alzheimer's disease brought in by t in by boyfriend from home for change in mental status. Medical workup was negative in ED, Psych service consulted for eval. On assessment, pt is a poor historian and poorly oriented. Per pt's boyfriend and sister at bedside, pt has been increasingly forgetful, "not herself", with disrupted sleep, wandering out of the house, and worsening ADLs. Per EMS report, pt has a dx of schizophrenia, however, per pt's family, pt does not have a history of schizophrenia. At this time, pt does not appear acutely psychotic, and chart review of prior [facility] admissions does not include diagnosis of schizophrenia. Pt's symptoms are likely due to the chronic, progressive cognitive decline 2/2 dementia. Pt does not appear to have an acute psychiatric illness that is modifiable by acute inpatient psychiatric admission. However, pt's family does not feel comfortable taking care of pt at home. At this time, would recommend a social work consult for recs on placement.

## 2018-03-22 NOTE — ED BEHAVIORAL HEALTH ASSESSMENT NOTE - DESCRIPTION
pt is seen sitting in bed with her boyfriend and sister at bedside HTN, DLD lives with boyfriend in [place], adult daughters domiciled separately and not involved in pt's care.

## 2018-03-22 NOTE — ED BEHAVIORAL HEALTH ASSESSMENT NOTE - DETAILS
n/a per chart, pt was admitted to [facility] for overdose in 2014. No current SI deferred discussed with referent

## 2018-03-22 NOTE — ED PROVIDER NOTE - ATTENDING CONTRIBUTION TO CARE
76 y/o F pmh as noted, except unclear if actually schizophrenic, as reported by EMS, based on prior chart, p/w AMS.  AS per EMS as per boyfriend, pt was NL last night, got up at this 5am to use bathroom, later found in bed, awake, but not speaking.  Cannot obtain ROS.  PE: NAD; NCAT; PERRL; mild dry MM; neck supple, no cspine ttp; CTAB; RRR; abd S/NT; pelvis stable; extrem atraumatic, non ttp; responds to voice, did follow 1 step command, requests water, then stops speaking, moving arms minimally only.  iMP: CVA out of window, vs metabolic vs infectious.  P: CTH, cxr, ekg, rectal temp, labs, ua, reassess.

## 2018-03-22 NOTE — ED BEHAVIORAL HEALTH ASSESSMENT NOTE - OTHER PAST PSYCHIATRIC HISTORY (INCLUDE DETAILS REGARDING ONSET, COURSE OF ILLNESS, INPATIENT/OUTPATIENT TREATMENT)
Pt was diagnosed with Alzheimer's disease by her PMD Dr Falk a few months ago. Per pt's boyfriend, pt was seeing Dr Jones outpatient for depression but has not seen her in at least 7-8 months.

## 2018-03-22 NOTE — H&P ADULT - ATTENDING COMMENTS
Pt seen and examined independently.  She is sitting in a chair with Cobb restraint and wants to get up.  Denies pain.   Not oriented to place.  Moves all extremities. Psych consult appreciated - pt with severe Alzheimer's dementia.  Case management/ for placement.   Labs reviewed    Complete Blood Count + Automated Diff in AM (03.23.18 @ 08:07)    WBC Count: 4.68 K/uL    RBC Count: 4.20 M/uL    Hemoglobin: 11.7 g/dL    Hematocrit: 36.9 %    Mean Cell Volume: 87.9 fL    Mean Cell Hemoglobin: 27.9 pg    Mean Cell Hemoglobin Conc: 31.7 g/dL    Red Cell Distrib Width: 14.6 %    Platelet Count - Automated: 241 K/uL    Auto Neutrophil #: 2.79 K/uL    Auto Lymphocyte #: 1.27 K/uL    Auto Monocyte #: 0.53 K/uL    Auto Eosinophil #: 0.03 K/uL    Auto Basophil #: 0.04 K/uL    Auto Neutrophil %: 59.7: Differential percentages must be correlated with absolute numbers for  clinical significance. %    Auto Lymphocyte %: 27.1 %    Auto Monocyte %: 11.3 %    Auto Eosinophil %: 0.6 %    Auto Basophil %: 0.9 %    Auto Immature Granulocyte %: 0.4 %    Basic Metabolic Panel in AM (03.23.18 @ 08:07)    Sodium, Serum: 146 mmol/L    Potassium, Serum: 3.4 mmol/L    Chloride, Serum: 106 mmol/L    Carbon Dioxide, Serum: 25 mmol/L    Anion Gap, Serum: 15 mmol/L    Blood Urea Nitrogen, Serum: 24 mg/dL    Creatinine, Serum: 0.7 mg/dL    Glucose, Serum: 104 mg/dL    Calcium, Total Serum: 9.0 mg/dL    eGFR if Non : 82: Interpretative comment  The units for eGFR are ml/min/1.73m2 (normalized body surface area). The  eGFR is calculated from a serum creatinine using the CKD-EPI equation.  Other variables required for calculation are race, age and sex. Among  patients with chronic kidney disease (CKD), the eGFR is useful in  determining the stage of disease according to KDOQI CKD classification.  All eGFR results are reported numerically with the following  interpretation.          GFR                    With                 Without     (ml/min/1.73 m2)    Kidney Damage       Kidney Damage        >= 90                    Stage 1                     Normal        60-89                    Stage 2                     Decreased GFR        30-59     Stage 3                     Stage 3        15-29                    Stage 4                     Stage 4        < 15                      Stage 5                     Stage 5  Each stage of CKD assumes that the associated GFR level has been in  effect for at least 3 months. Determination of stages one and two (with  eGFR > 59 ml/min/m2) requires estimation of kidney damage for at least 3  months as defined by structural or functional abnormalities.  Limitations: All estimates of GFR will be less accurate for patients at  extremes of muscle mass (including but not limited to frail elderly,  critically ill, or cancer patients), those with unusual diets, and those  with conditions associated with reduced secretion or extrarenal  elimination of creatinine. The eGFR equation is not recommended for use  in patients with unstable creatinine levels. mL/min/1.73M2    eGFR if African American: 96 mL/min/1.73M2      Creatine Kinase, Serum (03.23.18 @ 08:07)    Creatine Kinase, Serum: 502 U/L      Continue IV hydration if intake remains poor (elevated CK level).  Needs calorie count/dietary evaluation.    I reviewed the resident's note and I agree with the physical exam, assessment and plan with additions as above.

## 2018-03-22 NOTE — H&P ADULT - PSH
Acute diverticulitis    Arthrofibrosis of total knee replacement    History of cholecystectomy    History of colon resection

## 2018-03-22 NOTE — ED PROVIDER NOTE - PHYSICAL EXAMINATION
CONSTITUTIONAL: well-developed; well-nourished laying in stretcher in no acute distress.   SKIN: warm, dry  HEAD: Normocephalic; atraumatic.  EYES: PERRL, no conj injection  ENT: No nasal discharge; airway clear.  NECK: Supple; non tender.  CARD: S1, S2 normal  RESP: equal breath sounds bilaterally   ABD: soft ntnd  EXT: Normal ROM.  atraumatic extremities.  no lower extremity edema. 2+ radial and dp pulses.   NEURO: patient awake, not answering questions. following commands

## 2018-03-23 LAB
ANION GAP SERPL CALC-SCNC: 15 MMOL/L — HIGH (ref 7–14)
BASOPHILS # BLD AUTO: 0.04 K/UL — SIGNIFICANT CHANGE UP (ref 0–0.2)
BASOPHILS NFR BLD AUTO: 0.9 % — SIGNIFICANT CHANGE UP (ref 0–1)
BUN SERPL-MCNC: 24 MG/DL — HIGH (ref 10–20)
CALCIUM SERPL-MCNC: 9 MG/DL — SIGNIFICANT CHANGE UP (ref 8.5–10.1)
CHLORIDE SERPL-SCNC: 106 MMOL/L — SIGNIFICANT CHANGE UP (ref 98–110)
CK SERPL-CCNC: 300 U/L — HIGH (ref 0–225)
CK SERPL-CCNC: 502 U/L — HIGH (ref 0–225)
CO2 SERPL-SCNC: 25 MMOL/L — SIGNIFICANT CHANGE UP (ref 17–32)
CREAT SERPL-MCNC: 0.7 MG/DL — SIGNIFICANT CHANGE UP (ref 0.7–1.5)
EOSINOPHIL # BLD AUTO: 0.03 K/UL — SIGNIFICANT CHANGE UP (ref 0–0.7)
EOSINOPHIL NFR BLD AUTO: 0.6 % — SIGNIFICANT CHANGE UP (ref 0–8)
GLUCOSE SERPL-MCNC: 104 MG/DL — SIGNIFICANT CHANGE UP (ref 70–110)
HCT VFR BLD CALC: 36.9 % — LOW (ref 37–47)
HGB BLD-MCNC: 11.7 G/DL — LOW (ref 12–16)
IMM GRANULOCYTES NFR BLD AUTO: 0.4 % — HIGH (ref 0.1–0.3)
LYMPHOCYTES # BLD AUTO: 1.27 K/UL — SIGNIFICANT CHANGE UP (ref 1.2–3.4)
LYMPHOCYTES # BLD AUTO: 27.1 % — SIGNIFICANT CHANGE UP (ref 20.5–51.1)
MCHC RBC-ENTMCNC: 27.9 PG — SIGNIFICANT CHANGE UP (ref 27–31)
MCHC RBC-ENTMCNC: 31.7 G/DL — LOW (ref 32–37)
MCV RBC AUTO: 87.9 FL — SIGNIFICANT CHANGE UP (ref 81–99)
MONOCYTES # BLD AUTO: 0.53 K/UL — SIGNIFICANT CHANGE UP (ref 0.1–0.6)
MONOCYTES NFR BLD AUTO: 11.3 % — HIGH (ref 1.7–9.3)
NEUTROPHILS # BLD AUTO: 2.79 K/UL — SIGNIFICANT CHANGE UP (ref 1.4–6.5)
NEUTROPHILS NFR BLD AUTO: 59.7 % — SIGNIFICANT CHANGE UP (ref 42.2–75.2)
PLATELET # BLD AUTO: 241 K/UL — SIGNIFICANT CHANGE UP (ref 130–400)
POTASSIUM SERPL-MCNC: 3.4 MMOL/L — LOW (ref 3.5–5)
POTASSIUM SERPL-SCNC: 3.4 MMOL/L — LOW (ref 3.5–5)
RBC # BLD: 4.2 M/UL — SIGNIFICANT CHANGE UP (ref 4.2–5.4)
RBC # FLD: 14.6 % — HIGH (ref 11.5–14.5)
SODIUM SERPL-SCNC: 146 MMOL/L — SIGNIFICANT CHANGE UP (ref 135–146)
TROPONIN T SERPL-MCNC: <0.01 NG/ML — SIGNIFICANT CHANGE UP
TROPONIN T SERPL-MCNC: <0.01 NG/ML — SIGNIFICANT CHANGE UP
VIT B12 SERPL-MCNC: 857 PG/ML — SIGNIFICANT CHANGE UP (ref 232–1245)
WBC # BLD: 4.68 K/UL — LOW (ref 4.8–10.8)
WBC # FLD AUTO: 4.68 K/UL — LOW (ref 4.8–10.8)

## 2018-03-23 RX ADMIN — HEPARIN SODIUM 5000 UNIT(S): 5000 INJECTION INTRAVENOUS; SUBCUTANEOUS at 06:00

## 2018-03-23 RX ADMIN — DONEPEZIL HYDROCHLORIDE 10 MILLIGRAM(S): 10 TABLET, FILM COATED ORAL at 22:00

## 2018-03-23 RX ADMIN — OLANZAPINE 7.5 MILLIGRAM(S): 15 TABLET, FILM COATED ORAL at 22:00

## 2018-03-23 RX ADMIN — AMLODIPINE BESYLATE 10 MILLIGRAM(S): 2.5 TABLET ORAL at 06:15

## 2018-03-23 RX ADMIN — MEMANTINE HYDROCHLORIDE 10 MILLIGRAM(S): 10 TABLET ORAL at 17:04

## 2018-03-23 RX ADMIN — HEPARIN SODIUM 5000 UNIT(S): 5000 INJECTION INTRAVENOUS; SUBCUTANEOUS at 22:00

## 2018-03-23 RX ADMIN — HEPARIN SODIUM 5000 UNIT(S): 5000 INJECTION INTRAVENOUS; SUBCUTANEOUS at 17:00

## 2018-03-23 RX ADMIN — ATORVASTATIN CALCIUM 80 MILLIGRAM(S): 80 TABLET, FILM COATED ORAL at 22:00

## 2018-03-23 RX ADMIN — DULOXETINE HYDROCHLORIDE 60 MILLIGRAM(S): 30 CAPSULE, DELAYED RELEASE ORAL at 12:52

## 2018-03-23 RX ADMIN — PANTOPRAZOLE SODIUM 40 MILLIGRAM(S): 20 TABLET, DELAYED RELEASE ORAL at 06:15

## 2018-03-23 RX ADMIN — Medication 25 MILLIGRAM(S): at 17:04

## 2018-03-23 RX ADMIN — GABAPENTIN 100 MILLIGRAM(S): 400 CAPSULE ORAL at 22:00

## 2018-03-23 RX ADMIN — LISINOPRIL 20 MILLIGRAM(S): 2.5 TABLET ORAL at 06:16

## 2018-03-23 RX ADMIN — GABAPENTIN 100 MILLIGRAM(S): 400 CAPSULE ORAL at 16:56

## 2018-03-23 RX ADMIN — Medication 25 MILLIGRAM(S): at 06:16

## 2018-03-23 RX ADMIN — GABAPENTIN 100 MILLIGRAM(S): 400 CAPSULE ORAL at 06:16

## 2018-03-23 RX ADMIN — Medication 0.5 MILLIGRAM(S): at 06:10

## 2018-03-23 RX ADMIN — MEMANTINE HYDROCHLORIDE 10 MILLIGRAM(S): 10 TABLET ORAL at 06:16

## 2018-03-23 NOTE — DIETITIAN INITIAL EVALUATION ADULT. - OTHER INFO
Pt with worsening mental status, agitation, poor oral intake likely 2/2 Advancing Alzheimer's Dementia, r/o medical causes (hypothyroidism, infection, vitamin b12 def). IVF (D5NS at 75) for poor po intake, check calorie count, dietary referral in place. Elevated CK likely 2/2 to dehydration. CKD 3: will provide IVF considering decreased po. Unable to obtain ht, UBW or assess wt changes PTA; pt denying changes but unreliable source. Will attempt to obtain nutrition hx at follow up. (Reason for assessment: calorie count ordered) Pt with worsening mental status, agitation, poor oral intake likely 2/2 Advancing Alzheimer's Dementia, r/o medical causes (hypothyroidism, infection, vitamin b12 def). IVF (D5NS at 75) for poor po intake, check calorie count, dietary referral in place. Elevated CK likely 2/2 to dehydration. CKD 3: will provide IVF considering decreased po. Unable to obtain ht, UBW or assess wt changes PTA; pt denying changes but unreliable source. Will attempt to obtain nutrition hx at follow up. Calorie count hung, results due 3/26. (Reason for assessment: calorie count ordered)

## 2018-03-23 NOTE — PROGRESS NOTE ADULT - SUBJECTIVE AND OBJECTIVE BOX
LENGTH OF HOSPITAL STAY: 1d    CHIEF COMPLAINT:   Patient is a 77y old  Female who presents with a chief complaint of worsening dementia, family member unable to take care of her (22 Mar 2018 16:36)      Overnight events:    No acute events overnight    ALLERGIES:  aspirin (Unknown)  penicillins (Unknown)    MEDICATIONS:  STANDING MEDICATIONS  amLODIPine   Tablet 10 milliGRAM(s) Oral daily  atorvastatin 80 milliGRAM(s) Oral at bedtime  dextrose 5% + sodium chloride 0.9%. 1000 milliLiter(s) IV Continuous <Continuous>  donepezil 10 milliGRAM(s) Oral at bedtime  DULoxetine 60 milliGRAM(s) Oral daily  gabapentin 100 milliGRAM(s) Oral three times a day  heparin  Injectable 5000 Unit(s) SubCutaneous every 8 hours  hydrOXYzine hydrochloride 25 milliGRAM(s) Oral every 6 hours  lisinopril 20 milliGRAM(s) Oral daily  memantine 10 milliGRAM(s) Oral two times a day  OLANZapine 7.5 milliGRAM(s) Oral at bedtime  pantoprazole    Tablet 40 milliGRAM(s) Oral before breakfast    PRN MEDICATIONS  acetaminophen   Tablet. 650 milliGRAM(s) Oral every 6 hours PRN  clonazePAM Tablet 0.5 milliGRAM(s) Oral every 6 hours PRN    VITALS:   T(F): 97.1  HR: 95  BP: 122/62  RR: 18  SpO2: 100%    LABS:                        11.7   4.68  )-----------( 241      ( 23 Mar 2018 08:07 )             36.9     03-23    146  |  106  |  24<H>  ----------------------------<  104  3.4<L>   |  25  |  0.7    Ca    9.0      23 Mar 2018 08:07    TPro  6.7  /  Alb  4.3  /  TBili  0.5  /  DBili  x   /  AST  21  /  ALT  13  /  AlkPhos  70  03-22    PT/INR - ( 22 Mar 2018 11:31 )   PT: 11.70 sec;   INR: 1.08 ratio         PTT - ( 22 Mar 2018 11:31 )  PTT:22.1 sec  Urinalysis Basic - ( 22 Mar 2018 11:32 )    Color: Dark Yellow / Appearance: Slightly Cloudy / SG: >=1.035 / pH: x  Gluc: x / Ketone: Negative  / Bili: Negative / Urobili: 0.2   Blood: x / Protein: >=300 / Nitrite: Negative   Leuk Esterase: Negative / RBC: x / WBC x   Sq Epi: x / Non Sq Epi: Few /HPF / Bacteria: x        Creatine Kinase, Serum: 502 U/L <H> (03-23-18 @ 08:07)  Troponin T, Serum: <0.01 ng/mL (03-23-18 @ 08:07)  Creatine Kinase, Serum: 300 U/L <H> (03-23-18 @ 01:41)  Troponin T, Serum: <0.01 ng/mL (03-23-18 @ 01:41)      CARDIAC MARKERS ( 23 Mar 2018 08:07 )  x     / <0.01 ng/mL / 502 U/L / x     / 7.3 ng/mL  CARDIAC MARKERS ( 23 Mar 2018 02:14 )  x     / x     / x     / x     / 4.8 ng/mL  CARDIAC MARKERS ( 23 Mar 2018 01:41 )  x     / <0.01 ng/mL / 300 U/L / x     / x      CARDIAC MARKERS ( 22 Mar 2018 11:31 )  x     / <0.01 ng/mL / 334 U/L / x     / 6.7 ng/mL        Cultures:      RADIOLOGY:    PHYSICAL EXAM:  GEN: No acute distress  LUNGS: Clear to auscultation bilaterally   HEART: S1/S2 present. RRR.   ABD: Soft, non-tender, non-distended. Bowel sounds present  EXT: no cyanosis, no pitting edema  NEURO: AAOX0, lethargic, responsive

## 2018-03-23 NOTE — DIETITIAN INITIAL EVALUATION ADULT. - PHYSICAL APPEARANCE
BMI: unable to calculate BMI as no ht noted for pt and pt couldn't remember ht; skin: intact, BS =15

## 2018-03-23 NOTE — DIETITIAN INITIAL EVALUATION ADULT. - ORAL INTAKE PTA
poor/recent intake poor per MD note as pt boyfriend had said she had only consumed 1 egg in the 3 days PTA

## 2018-03-23 NOTE — PROGRESS NOTE ADULT - ASSESSMENT
78 y/o F with PMHx hypertension, diverticulitis, DLD, COPD, GERD, Alzheimers dementia, PSHx colon resection, CCY, brought in by live in boyfriend Steven for worsening mental status and inability to take care of pt.     1. Worsening mental status, agitation, poor oral intake likely 2/2 Advancing Alzheimer's Dementia, r/o medical causes (hypothyroidism, infection, vitamin b12 def)  -c/w home medications: namzaric, clonazepam, duloxetine, gabapentin, hydroxyzine  -will follow up tsh, folate, thimine   -IVF (D5NS at 75) for poor po intake, check calorie count, dietary referral in place    2.Elevated CK likely 2/2 dehydration  -will repeat  -no ischemic changes on EKG  -social work referral, pt might benefit from placement in a dementia long term care placement    2. HTN  -c/w amlodipine, lisinopril    3. GERD  -c/w protonix    4. COPD  -albuterol prn, no acute resp distress    6. CKD 3A  -will provide IVF considering decreased po intake     #DVT PPx: hep [id number]

## 2018-03-24 LAB
ANION GAP SERPL CALC-SCNC: 13 MMOL/L — SIGNIFICANT CHANGE UP (ref 7–14)
BASOPHILS # BLD AUTO: 0.03 K/UL — SIGNIFICANT CHANGE UP (ref 0–0.2)
BASOPHILS NFR BLD AUTO: 0.6 % — SIGNIFICANT CHANGE UP (ref 0–1)
BUN SERPL-MCNC: 24 MG/DL — HIGH (ref 10–20)
CALCIUM SERPL-MCNC: 8.7 MG/DL — SIGNIFICANT CHANGE UP (ref 8.5–10.1)
CHLORIDE SERPL-SCNC: 103 MMOL/L — SIGNIFICANT CHANGE UP (ref 98–110)
CO2 SERPL-SCNC: 26 MMOL/L — SIGNIFICANT CHANGE UP (ref 17–32)
CREAT SERPL-MCNC: 0.9 MG/DL — SIGNIFICANT CHANGE UP (ref 0.7–1.5)
EOSINOPHIL # BLD AUTO: 0.05 K/UL — SIGNIFICANT CHANGE UP (ref 0–0.7)
EOSINOPHIL NFR BLD AUTO: 1 % — SIGNIFICANT CHANGE UP (ref 0–8)
GLUCOSE SERPL-MCNC: 105 MG/DL — HIGH (ref 70–99)
HCT VFR BLD CALC: 33.8 % — LOW (ref 37–47)
HGB BLD-MCNC: 10.7 G/DL — LOW (ref 12–16)
IMM GRANULOCYTES NFR BLD AUTO: 0.4 % — HIGH (ref 0.1–0.3)
LYMPHOCYTES # BLD AUTO: 1.37 K/UL — SIGNIFICANT CHANGE UP (ref 1.2–3.4)
LYMPHOCYTES # BLD AUTO: 27.2 % — SIGNIFICANT CHANGE UP (ref 20.5–51.1)
MAGNESIUM SERPL-MCNC: 1.7 MG/DL — LOW (ref 1.8–2.4)
MCHC RBC-ENTMCNC: 28.1 PG — SIGNIFICANT CHANGE UP (ref 27–31)
MCHC RBC-ENTMCNC: 31.7 G/DL — LOW (ref 32–37)
MCV RBC AUTO: 88.7 FL — SIGNIFICANT CHANGE UP (ref 81–99)
MONOCYTES # BLD AUTO: 0.48 K/UL — SIGNIFICANT CHANGE UP (ref 0.1–0.6)
MONOCYTES NFR BLD AUTO: 9.5 % — HIGH (ref 1.7–9.3)
NEUTROPHILS # BLD AUTO: 3.09 K/UL — SIGNIFICANT CHANGE UP (ref 1.4–6.5)
NEUTROPHILS NFR BLD AUTO: 61.3 % — SIGNIFICANT CHANGE UP (ref 42.2–75.2)
NRBC # BLD: 0 /100 WBCS — SIGNIFICANT CHANGE UP (ref 0–0)
PHOSPHATE SERPL-MCNC: 3.2 MG/DL — SIGNIFICANT CHANGE UP (ref 2.1–4.9)
PLATELET # BLD AUTO: 265 K/UL — SIGNIFICANT CHANGE UP (ref 130–400)
POTASSIUM SERPL-MCNC: 3.3 MMOL/L — LOW (ref 3.5–5)
POTASSIUM SERPL-SCNC: 3.3 MMOL/L — LOW (ref 3.5–5)
RBC # BLD: 3.81 M/UL — LOW (ref 4.2–5.4)
RBC # FLD: 14.8 % — HIGH (ref 11.5–14.5)
SODIUM SERPL-SCNC: 142 MMOL/L — SIGNIFICANT CHANGE UP (ref 135–146)
TSH SERPL-MCNC: 2.09 UIU/ML — SIGNIFICANT CHANGE UP (ref 0.27–4.2)
WBC # BLD: 5.04 K/UL — SIGNIFICANT CHANGE UP (ref 4.8–10.8)
WBC # FLD AUTO: 5.04 K/UL — SIGNIFICANT CHANGE UP (ref 4.8–10.8)

## 2018-03-24 RX ORDER — ATORVASTATIN CALCIUM 80 MG/1
80 TABLET, FILM COATED ORAL AT BEDTIME
Qty: 0 | Refills: 0 | Status: DISCONTINUED | OUTPATIENT
Start: 2018-03-24 | End: 2018-03-24

## 2018-03-24 RX ADMIN — SODIUM CHLORIDE 75 MILLILITER(S): 9 INJECTION, SOLUTION INTRAVENOUS at 11:02

## 2018-03-24 RX ADMIN — HEPARIN SODIUM 5000 UNIT(S): 5000 INJECTION INTRAVENOUS; SUBCUTANEOUS at 07:02

## 2018-03-24 RX ADMIN — PANTOPRAZOLE SODIUM 40 MILLIGRAM(S): 20 TABLET, DELAYED RELEASE ORAL at 07:02

## 2018-03-24 RX ADMIN — ATORVASTATIN CALCIUM 80 MILLIGRAM(S): 80 TABLET, FILM COATED ORAL at 21:40

## 2018-03-24 RX ADMIN — DONEPEZIL HYDROCHLORIDE 10 MILLIGRAM(S): 10 TABLET, FILM COATED ORAL at 21:39

## 2018-03-24 RX ADMIN — Medication 25 MILLIGRAM(S): at 18:02

## 2018-03-24 RX ADMIN — GABAPENTIN 100 MILLIGRAM(S): 400 CAPSULE ORAL at 07:03

## 2018-03-24 RX ADMIN — GABAPENTIN 100 MILLIGRAM(S): 400 CAPSULE ORAL at 14:45

## 2018-03-24 RX ADMIN — MEMANTINE HYDROCHLORIDE 10 MILLIGRAM(S): 10 TABLET ORAL at 18:03

## 2018-03-24 RX ADMIN — Medication 25 MILLIGRAM(S): at 11:14

## 2018-03-24 RX ADMIN — DULOXETINE HYDROCHLORIDE 60 MILLIGRAM(S): 30 CAPSULE, DELAYED RELEASE ORAL at 11:14

## 2018-03-24 RX ADMIN — HEPARIN SODIUM 5000 UNIT(S): 5000 INJECTION INTRAVENOUS; SUBCUTANEOUS at 14:45

## 2018-03-24 RX ADMIN — MEMANTINE HYDROCHLORIDE 10 MILLIGRAM(S): 10 TABLET ORAL at 07:02

## 2018-03-24 RX ADMIN — GABAPENTIN 100 MILLIGRAM(S): 400 CAPSULE ORAL at 21:39

## 2018-03-24 RX ADMIN — HEPARIN SODIUM 5000 UNIT(S): 5000 INJECTION INTRAVENOUS; SUBCUTANEOUS at 21:39

## 2018-03-24 RX ADMIN — OLANZAPINE 7.5 MILLIGRAM(S): 15 TABLET, FILM COATED ORAL at 21:39

## 2018-03-24 NOTE — PROGRESS NOTE ADULT - SUBJECTIVE AND OBJECTIVE BOX
KYLE TEE  [facility] T2-3A 016 B ([facility] T2-3A)            Patient was evaluated and examined  by bedside, remains pleasantly confused, tolerating diet well                REVIEW OF SYSTEMS:  unable to obtain due patient's dementia      T(C): , Max: 36.6 (03-23-18 @ 20:50)  HR: 63 (03-24-18 @ 04:45)  BP: 101/51 (03-24-18 @ 04:45)  RR: 18 (03-24-18 @ 04:45)  SpO2: --  CAPILLARY BLOOD GLUCOSE          PHYSICAL EXAM:  General: NAD, Awake, patient is laying comfortably in bed  HEENT: AT, NC, Supple, NO JVD, NO CB  Lungs: CTA B/L, no wheezing, no rhonchi  CVS: normal S1, S2, RRR, NO M/G/R  Abdomen: soft, bowel sounds present, non-tender, non-distended  Extremities: no edema, no clubbing, no cyanosis, positive peripheral pulses b/l  Neuro: no acute focal neurological deficits  Skin: no rush, no ecchymosis      LAB  CBC  Date: 03-24-18 @ 09:07  Mean cell Eiuztkuxfe39.1  Mean cell Hemoglobin Conc31.7  Mean cell Volum 88.7  Platelet count-Automate 265  RBC Count 3.81  Red Cell Distrib Width14.8  WBC Count5.04  % Albumin, Urine--  Hematocrit 33.8  Hemoglobin 10.7  CBC  Date: 03-23-18 @ 08:07  Mean cell Efrnuebnke58.9  Mean cell Hemoglobin Conc31.7  Mean cell Volum 87.9  Platelet count-Automate 241  RBC Count 4.20  Red Cell Distrib Width14.6  WBC Count4.68  % Albumin, Urine--  Hematocrit 36.9  Hemoglobin 11.7  CBC  Date: 03-22-18 @ 11:31  Mean cell Obkibyqzzs76.3  Mean cell Hemoglobin Conc32.4  Mean cell Volum 87.2  Platelet count-Automate 316  RBC Count 4.14  Red Cell Distrib Width14.6  WBC Count5.12  % Albumin, Urine--  Hematocrit 36.1  Hemoglobin 11.7    BMP  03-24-18 @ 09:07  Blood Gas Arterial-Calcium,Ionized--  Blood Urea Nitrogen, Serum 24 mg/dL<H> [10 - 20]  Carbon Dioxide, Serum26 mmol/L [17 - 32]  Chloride, Mfglw389 mmol/L [98 - 110]  Creatinie, Serum0.9 mg/dL [0.7 - 1.5]  Glucose, Capqd370 mg/dL<H> [70 - 99]  Potassium, Serum3.3 mmol/L<L> [3.5 - 5.0]  Sodium, Serum 142 mmol/L [135 - 146]  BMP  03-23-18 @ 08:07  Blood Gas Arterial-Calcium,Ionized--  Blood Urea Nitrogen, Serum 24 mg/dL<H> [10 - 20]  Carbon Dioxide, Serum25 mmol/L [17 - 32]  Chloride, Abofo057 mmol/L [98 - 110]  Creatinie, Serum0.7 mg/dL [0.7 - 1.5]  Glucose, Vwucb303 mg/dL [70 - 110]  Potassium, Serum3.4 mmol/L<L> [3.5 - 5.0]  Sodium, Serum 146 mmol/L [135 - 146]  [facility]  03-22-18 @ 11:31  Blood Gas Arterial-Calcium,Ionized--  Blood Urea Nitrogen, Serum 30 mg/dL<H> [10 - 20]  Carbon Dioxide, Serum25 mmol/L [17 - 32]  Chloride, Hmigj529 mmol/L [98 - 110]  Creatinie, Serum1.0 mg/dL [0.7 - 1.5]  Glucose, Mrqcw219 mg/dL<H> [70 - 110]  Potassium, Serum3.7 mmol/L [3.5 - 5.0]  Sodium, Serum 146 mmol/L [135 - 146]              Microbiology:    Culture - Blood (collected 03-23-18 @ 01:41)  Source: .Blood None  Preliminary Report (03-24-18 @ 07:01):    No growth to date.    Culture - Urine (collected 03-10-18 @ 08:57)  Source: .Urine Clean Catch (Midstream)  Final Report (03-11-18 @ 22:15):    Culture grew 3 or more types of organisms which indicate    collection contamination; consider recollection only if clinically    indicated.        Medications:  acetaminophen   Tablet. 650 milliGRAM(s) Oral every 6 hours PRN  atorvastatin 80 milliGRAM(s) Oral at bedtime  clonazePAM Tablet 0.5 milliGRAM(s) Oral every 6 hours PRN  dextrose 5% + sodium chloride 0.9%. 1000 milliLiter(s) IV Continuous <Continuous>  donepezil 10 milliGRAM(s) Oral at bedtime  DULoxetine 60 milliGRAM(s) Oral daily  gabapentin 100 milliGRAM(s) Oral three times a day  heparin  Injectable 5000 Unit(s) SubCutaneous every 8 hours  hydrOXYzine hydrochloride 25 milliGRAM(s) Oral every 6 hours  memantine 10 milliGRAM(s) Oral two times a day  OLANZapine 7.5 milliGRAM(s) Oral at bedtime  pantoprazole    Tablet 40 milliGRAM(s) Oral before breakfast        Assessment and Plan:  76 y/o F with PMHx hypertension, diverticulitis, DLD, COPD, GERD, Alzheimers dementia, PSHx colon resection,  c/w worsening mental status and inability to take care of pt. at home. Continue current medical therapy, f/up  for SNIF placement.

## 2018-03-25 LAB — CK SERPL-CCNC: 162 U/L — SIGNIFICANT CHANGE UP (ref 0–225)

## 2018-03-25 RX ADMIN — GABAPENTIN 100 MILLIGRAM(S): 400 CAPSULE ORAL at 14:12

## 2018-03-25 RX ADMIN — PANTOPRAZOLE SODIUM 40 MILLIGRAM(S): 20 TABLET, DELAYED RELEASE ORAL at 06:38

## 2018-03-25 RX ADMIN — DULOXETINE HYDROCHLORIDE 60 MILLIGRAM(S): 30 CAPSULE, DELAYED RELEASE ORAL at 12:35

## 2018-03-25 RX ADMIN — DONEPEZIL HYDROCHLORIDE 10 MILLIGRAM(S): 10 TABLET, FILM COATED ORAL at 21:29

## 2018-03-25 RX ADMIN — MEMANTINE HYDROCHLORIDE 10 MILLIGRAM(S): 10 TABLET ORAL at 06:38

## 2018-03-25 RX ADMIN — HEPARIN SODIUM 5000 UNIT(S): 5000 INJECTION INTRAVENOUS; SUBCUTANEOUS at 21:29

## 2018-03-25 RX ADMIN — HEPARIN SODIUM 5000 UNIT(S): 5000 INJECTION INTRAVENOUS; SUBCUTANEOUS at 14:12

## 2018-03-25 RX ADMIN — HEPARIN SODIUM 5000 UNIT(S): 5000 INJECTION INTRAVENOUS; SUBCUTANEOUS at 06:38

## 2018-03-25 RX ADMIN — MEMANTINE HYDROCHLORIDE 10 MILLIGRAM(S): 10 TABLET ORAL at 17:26

## 2018-03-25 RX ADMIN — Medication 25 MILLIGRAM(S): at 06:39

## 2018-03-25 RX ADMIN — GABAPENTIN 100 MILLIGRAM(S): 400 CAPSULE ORAL at 06:38

## 2018-03-25 RX ADMIN — Medication 25 MILLIGRAM(S): at 14:14

## 2018-03-25 RX ADMIN — Medication 25 MILLIGRAM(S): at 17:26

## 2018-03-25 RX ADMIN — ATORVASTATIN CALCIUM 80 MILLIGRAM(S): 80 TABLET, FILM COATED ORAL at 21:29

## 2018-03-25 RX ADMIN — GABAPENTIN 100 MILLIGRAM(S): 400 CAPSULE ORAL at 21:29

## 2018-03-25 RX ADMIN — OLANZAPINE 7.5 MILLIGRAM(S): 15 TABLET, FILM COATED ORAL at 21:29

## 2018-03-25 NOTE — PROGRESS NOTE ADULT - SUBJECTIVE AND OBJECTIVE BOX
KYLE TEE  [facility] T2-3A 016 B ([facility] T2-3A)            Patient was evaluated and examined  by bedside, calm and sleeping comfortably in bed        REVIEW OF SYSTEMS:  unable to obtain due to patient's dementia      T(C): , Max: 36.3 (03-24-18 @ 14:23)  HR: 52 (03-25-18 @ 04:38)  BP: 117/61 (03-25-18 @ 04:38)  RR: 18 (03-25-18 @ 04:38)  SpO2: 98% (03-24-18 @ 20:19)  CAPILLARY BLOOD GLUCOSE          PHYSICAL EXAM:  General: NAD, Awake, patient is laying comfortably in bed, pleasantly confused  HEENT: AT, NC, Supple, NO JVD, NO CB  Lungs: CTA B/L, no wheezing, no rhonchi  CVS: normal S1, S2, RRR, NO M/G/R  Abdomen: soft, bowel sounds present, non-tender, non-distended  Extremities: no edema, no clubbing, no cyanosis, positive peripheral pulses b/l  Neuro: no acute focal neurological deficits  Skin: no rush, no ecchymosis      LAB  CBC  Date: 03-24-18 @ 09:07  Mean cell Wfhnnkuqei97.1  Mean cell Hemoglobin Conc31.7  Mean cell Volum 88.7  Platelet count-Automate 265  RBC Count 3.81  Red Cell Distrib Width14.8  WBC Count5.04  % Albumin, Urine--  Hematocrit 33.8  Hemoglobin 10.7  CBC  Date: 03-23-18 @ 08:07  Mean cell Zosoaocvop03.9  Mean cell Hemoglobin Conc31.7  Mean cell Volum 87.9  Platelet count-Automate 241  RBC Count 4.20  Red Cell Distrib Width14.6  WBC Count4.68  % Albumin, Urine--  Hematocrit 36.9  Hemoglobin 11.7  CBC  Date: 03-22-18 @ 11:31  Mean cell Ypqhablyox91.3  Mean cell Hemoglobin Conc32.4  Mean cell Volum 87.2  Platelet count-Automate 316  RBC Count 4.14  Red Cell Distrib Width14.6  WBC Count5.12  % Albumin, Urine--  Hematocrit 36.1  Hemoglobin 11.7    BMP  03-24-18 @ 09:07  Blood Gas Arterial-Calcium,Ionized--  Blood Urea Nitrogen, Serum 24 mg/dL<H> [10 - 20]  Carbon Dioxide, Serum26 mmol/L [17 - 32]  Chloride, Hskxc125 mmol/L [98 - 110]  Creatinie, Serum0.9 mg/dL [0.7 - 1.5]  Glucose, Ynnjd510 mg/dL<H> [70 - 99]  Potassium, Serum3.3 mmol/L<L> [3.5 - 5.0]  Sodium, Serum 142 mmol/L [135 - 146]  [facility]  03-23-18 @ 08:07  Blood Gas Arterial-Calcium,Ionized--  Blood Urea Nitrogen, Serum 24 mg/dL<H> [10 - 20]  Carbon Dioxide, Serum25 mmol/L [17 - 32]  Chloride, Oelza427 mmol/L [98 - 110]  Creatinie, Serum0.7 mg/dL [0.7 - 1.5]  Glucose, Kzgfs706 mg/dL [70 - 110]  Potassium, Serum3.4 mmol/L<L> [3.5 - 5.0]  Sodium, Serum 146 mmol/L [135 - 146]  [facility]  03-22-18 @ 11:31  Blood Gas Arterial-Calcium,Ionized--  Blood Urea Nitrogen, Serum 30 mg/dL<H> [10 - 20]  Carbon Dioxide, Serum25 mmol/L [17 - 32]  Chloride, Wzzpp533 mmol/L [98 - 110]  Creatinie, Serum1.0 mg/dL [0.7 - 1.5]  Glucose, Cwfxz633 mg/dL<H> [70 - 110]  Potassium, Serum3.7 mmol/L [3.5 - 5.0]  Sodium, Serum 146 mmol/L [135 - 146]              Microbiology:    Culture - Blood (collected 03-23-18 @ 01:41)  Source: .Blood None  Preliminary Report (03-24-18 @ 07:01):    No growth to date.    Culture - Urine (collected 03-10-18 @ 08:57)  Source: .Urine Clean Catch (Midstream)  Final Report (03-11-18 @ 22:15):    Culture grew 3 or more types of organisms which indicate    collection contamination; consider recollection only if clinically    indicated.        RADIOLOGY & ADDITIONAL TESTS:        Medications:  acetaminophen   Tablet. 650 milliGRAM(s) Oral every 6 hours PRN  atorvastatin 80 milliGRAM(s) Oral at bedtime  clonazePAM Tablet 0.5 milliGRAM(s) Oral every 6 hours PRN  donepezil 10 milliGRAM(s) Oral at bedtime  DULoxetine 60 milliGRAM(s) Oral daily  gabapentin 100 milliGRAM(s) Oral three times a day  heparin  Injectable 5000 Unit(s) SubCutaneous every 8 hours  hydrOXYzine hydrochloride 25 milliGRAM(s) Oral every 6 hours  memantine 10 milliGRAM(s) Oral two times a day  OLANZapine 7.5 milliGRAM(s) Oral at bedtime  pantoprazole    Tablet 40 milliGRAM(s) Oral before breakfast        Assessment and Plan:  76 y/o F with PMHx hypertension, diverticulitis, DLD, COPD, GERD, Alzheimers dementia, PSHx colon resection,  c/w worsening mental status and inability to take care of pt. at home. Continue current medical therapy, f/up  for SNIF placement.   Mild elevation of CK - f/up CK levels today, encourage fluid po intake,   Mild Hypokalemia, supplemented, f/up BMP  in am

## 2018-03-26 LAB
ANION GAP SERPL CALC-SCNC: 12 MMOL/L — SIGNIFICANT CHANGE UP (ref 7–14)
BUN SERPL-MCNC: 21 MG/DL — HIGH (ref 10–20)
CALCIUM SERPL-MCNC: 8.5 MG/DL — SIGNIFICANT CHANGE UP (ref 8.5–10.1)
CHLORIDE SERPL-SCNC: 107 MMOL/L — SIGNIFICANT CHANGE UP (ref 98–110)
CO2 SERPL-SCNC: 26 MMOL/L — SIGNIFICANT CHANGE UP (ref 17–32)
CREAT SERPL-MCNC: 0.7 MG/DL — SIGNIFICANT CHANGE UP (ref 0.7–1.5)
GLUCOSE SERPL-MCNC: 90 MG/DL — SIGNIFICANT CHANGE UP (ref 70–99)
POTASSIUM SERPL-MCNC: 3.2 MMOL/L — LOW (ref 3.5–5)
POTASSIUM SERPL-SCNC: 3.2 MMOL/L — LOW (ref 3.5–5)
SODIUM SERPL-SCNC: 145 MMOL/L — SIGNIFICANT CHANGE UP (ref 135–146)

## 2018-03-26 RX ADMIN — MEMANTINE HYDROCHLORIDE 10 MILLIGRAM(S): 10 TABLET ORAL at 05:35

## 2018-03-26 RX ADMIN — GABAPENTIN 100 MILLIGRAM(S): 400 CAPSULE ORAL at 13:06

## 2018-03-26 RX ADMIN — Medication 0.5 MILLIGRAM(S): at 20:20

## 2018-03-26 RX ADMIN — OLANZAPINE 7.5 MILLIGRAM(S): 15 TABLET, FILM COATED ORAL at 21:11

## 2018-03-26 RX ADMIN — Medication 25 MILLIGRAM(S): at 13:10

## 2018-03-26 RX ADMIN — ATORVASTATIN CALCIUM 80 MILLIGRAM(S): 80 TABLET, FILM COATED ORAL at 21:11

## 2018-03-26 RX ADMIN — HEPARIN SODIUM 5000 UNIT(S): 5000 INJECTION INTRAVENOUS; SUBCUTANEOUS at 13:06

## 2018-03-26 RX ADMIN — GABAPENTIN 100 MILLIGRAM(S): 400 CAPSULE ORAL at 05:35

## 2018-03-26 RX ADMIN — Medication 25 MILLIGRAM(S): at 05:35

## 2018-03-26 RX ADMIN — Medication 25 MILLIGRAM(S): at 20:19

## 2018-03-26 RX ADMIN — GABAPENTIN 100 MILLIGRAM(S): 400 CAPSULE ORAL at 21:11

## 2018-03-26 RX ADMIN — HEPARIN SODIUM 5000 UNIT(S): 5000 INJECTION INTRAVENOUS; SUBCUTANEOUS at 05:35

## 2018-03-26 RX ADMIN — PANTOPRAZOLE SODIUM 40 MILLIGRAM(S): 20 TABLET, DELAYED RELEASE ORAL at 06:23

## 2018-03-26 RX ADMIN — DULOXETINE HYDROCHLORIDE 60 MILLIGRAM(S): 30 CAPSULE, DELAYED RELEASE ORAL at 13:04

## 2018-03-26 RX ADMIN — DONEPEZIL HYDROCHLORIDE 10 MILLIGRAM(S): 10 TABLET, FILM COATED ORAL at 21:11

## 2018-03-26 RX ADMIN — MEMANTINE HYDROCHLORIDE 10 MILLIGRAM(S): 10 TABLET ORAL at 20:20

## 2018-03-26 NOTE — PROGRESS NOTE ADULT - SUBJECTIVE AND OBJECTIVE BOX
SUBJECTIVE:    Patient is a 77y old Female who presents with a chief complaint of worsening dementia, family member unable to take care of her (22 Mar 2018 16:36)    Currently admitted to medicine with the primary diagnosis of Weakness     Today is hospital day 4d. This morning she is resting comfortably in bed and reports no new issues or overnight events.     PAST MEDICAL & SURGICAL HISTORY  Diverticulitis  GERD (gastroesophageal reflux disease)  COPD (chronic obstructive pulmonary disease)  Dementia  Hypercholesterolemia  High blood pressure  History of cholecystectomy  History of colon resection  Acute diverticulitis  Arthrofibrosis of total knee replacement    SOCIAL HISTORY:  Negative for smoking/alcohol/drug use.     ALLERGIES:  aspirin (Unknown)  penicillins (Unknown)    MEDICATIONS:  STANDING MEDICATIONS  atorvastatin 80 milliGRAM(s) Oral at bedtime  donepezil 10 milliGRAM(s) Oral at bedtime  DULoxetine 60 milliGRAM(s) Oral daily  gabapentin 100 milliGRAM(s) Oral three times a day  heparin  Injectable 5000 Unit(s) SubCutaneous every 8 hours  hydrOXYzine hydrochloride 25 milliGRAM(s) Oral every 6 hours  memantine 10 milliGRAM(s) Oral two times a day  OLANZapine 7.5 milliGRAM(s) Oral at bedtime  pantoprazole    Tablet 40 milliGRAM(s) Oral before breakfast    PRN MEDICATIONS  acetaminophen   Tablet. 650 milliGRAM(s) Oral every 6 hours PRN  clonazePAM Tablet 0.5 milliGRAM(s) Oral every 6 hours PRN    VITALS:   T(F): 96.1  HR: 66  BP: 113/56  RR: 18  SpO2: --    LABS:    03-26    145  |  107  |  21<H>  ----------------------------<  90  3.2<L>   |  26  |  0.7    Ca    8.5      26 Mar 2018 09:21    CARDIAC MARKERS ( 25 Mar 2018 13:28 )  x     / x     / 162 U/L / x     / x        RADIOLOGY:    CXR: No radiographic evidence of acute cardiopulmonary disease.  CT head: 1.  No evidence of acute intracranial pathology.  Stable exam since   6/16/2014. 2.  Stable mild chronic microvascular changes.    PHYSICAL EXAM:  GEN: Somnolent, arousable to voice  LUNGS: Clear to auscultation bilaterally   HEART: S1/S2 present. RRR.   ABD: Soft, non-tender, non-distended. Bowel sounds present  EXT: NC/NC/NE/2+PP/ANAYA  NEURO: AAOX2

## 2018-03-26 NOTE — PROGRESS NOTE ADULT - SUBJECTIVE AND OBJECTIVE BOX
KYLE TEE  [facility] T2-3A 016 B ([facility] T2-3A)            Patient was evaluated and examined  by bedside, remains calm and cooperative, tolerating diet well                REVIEW OF SYSTEMS:  unable to obtain due to patient's dementia      T(C): , Max: 36.6 (03-26-18 @ 04:40)  HR: 70 (03-26-18 @ 04:40)  BP: 120/59 (03-26-18 @ 04:40)  RR: 18 (03-26-18 @ 04:40)  SpO2: --  CAPILLARY BLOOD GLUCOSE          PHYSICAL EXAM:  General: NAD, Awake, confused, patient is laying comfortably in bed  HEENT: AT, NC, Supple, NO JVD, NO CB  Lungs: CTA B/L, no wheezing, no rhonchi  CVS: normal S1, S2, RRR, NO M/G/R  Abdomen: soft, bowel sounds present, non-tender, non-distended  Extremities: no edema, no clubbing, no cyanosis, positive peripheral pulses b/l  Neuro: no acute focal neurological deficits  Skin: no rush, no ecchymosis      LAB  CBC  Date: 03-24-18 @ 09:07  Mean cell Tjqlnazftl99.1  Mean cell Hemoglobin Conc31.7  Mean cell Volum 88.7  Platelet count-Automate 265  RBC Count 3.81  Red Cell Distrib Width14.8  WBC Count5.04  % Albumin, Urine--  Hematocrit 33.8  Hemoglobin 10.7  CBC  Date: 03-23-18 @ 08:07  Mean cell Bcomwmbmyp73.9  Mean cell Hemoglobin Conc31.7  Mean cell Volum 87.9  Platelet count-Automate 241  RBC Count 4.20  Red Cell Distrib Width14.6  WBC Count4.68  % Albumin, Urine--  Hematocrit 36.9  Hemoglobin 11.7  CBC  Date: 03-22-18 @ 11:31  Mean cell Qlwdmrfurb61.3  Mean cell Hemoglobin Conc32.4  Mean cell Volum 87.2  Platelet count-Automate 316  RBC Count 4.14  Red Cell Distrib Width14.6  WBC Count5.12  % Albumin, Urine--  Hematocrit 36.1  Hemoglobin 11.7    [facility]  03-26-18 @ 09:21  Blood Gas Arterial-Calcium,Ionized--  Blood Urea Nitrogen, Serum 21 mg/dL<H> [10 - 20]  Carbon Dioxide, Serum26 mmol/L [17 - 32]  Chloride, Htkoh292 mmol/L [98 - 110]  Creatinie, Serum0.7 mg/dL [0.7 - 1.5]  Glucose, Serum90 mg/dL [70 - 99]  Potassium, Serum3.2 mmol/L<L> [3.5 - 5.0]  Sodium, Serum 145 mmol/L [135 - 146]  [facility]  03-24-18 @ 09:07  Blood Gas Arterial-Calcium,Ionized--  Blood Urea Nitrogen, Serum 24 mg/dL<H> [10 - 20]  Carbon Dioxide, Serum26 mmol/L [17 - 32]  Chloride, Ozqmi355 mmol/L [98 - 110]  Creatinie, Serum0.9 mg/dL [0.7 - 1.5]  Glucose, Lzwte570 mg/dL<H> [70 - 99]  Potassium, Serum3.3 mmol/L<L> [3.5 - 5.0]  Sodium, Serum 142 mmol/L [135 - 146]  [facility]  03-23-18 @ 08:07  Blood Gas Arterial-Calcium,Ionized--  Blood Urea Nitrogen, Serum 24 mg/dL<H> [10 - 20]  Carbon Dioxide, Serum25 mmol/L [17 - 32]  Chloride, Kftll551 mmol/L [98 - 110]  Creatinie, Serum0.7 mg/dL [0.7 - 1.5]  Glucose, Tsemo330 mg/dL [70 - 110]  Potassium, Serum3.4 mmol/L<L> [3.5 - 5.0]  Sodium, Serum 146 mmol/L [135 - 146]  [facility]  03-22-18 @ 11:31  Blood Gas Arterial-Calcium,Ionized--  Blood Urea Nitrogen, Serum 30 mg/dL<H> [10 - 20]  Carbon Dioxide, Serum25 mmol/L [17 - 32]  Chloride, Dvfmz199 mmol/L [98 - 110]  Creatinie, Serum1.0 mg/dL [0.7 - 1.5]  Glucose, Zchit545 mg/dL<H> [70 - 110]  Potassium, Serum3.7 mmol/L [3.5 - 5.0]  Sodium, Serum 146 mmol/L [135 - 146]              Microbiology:    Culture - Blood (collected 03-23-18 @ 01:41)  Source: .Blood None  Preliminary Report (03-24-18 @ 07:01):    No growth to date.    Culture - Urine (collected 03-10-18 @ 08:57)  Source: .Urine Clean Catch (Midstream)  Final Report (03-11-18 @ 22:15):    Culture grew 3 or more types of organisms which indicate    collection contamination; consider recollection only if clinically    indicated.          Medications:  acetaminophen   Tablet. 650 milliGRAM(s) Oral every 6 hours PRN  atorvastatin 80 milliGRAM(s) Oral at bedtime  clonazePAM Tablet 0.5 milliGRAM(s) Oral every 6 hours PRN  donepezil 10 milliGRAM(s) Oral at bedtime  DULoxetine 60 milliGRAM(s) Oral daily  gabapentin 100 milliGRAM(s) Oral three times a day  heparin  Injectable 5000 Unit(s) SubCutaneous every 8 hours  hydrOXYzine hydrochloride 25 milliGRAM(s) Oral every 6 hours  memantine 10 milliGRAM(s) Oral two times a day  OLANZapine 7.5 milliGRAM(s) Oral at bedtime  pantoprazole    Tablet 40 milliGRAM(s) Oral before breakfast        Assessment and Plan:  76 y/o F with PMHx hypertension, diverticulitis, DLD, COPD, GERD, Alzheimers dementia, PSHx colon resection,  c/w worsening mental status and inability of family to take care of pt. at home. Continue current medical therapy, f/up  for SNIF placement.   Mild elevation of CK - decreased post po fluid intake.   Mild Hypokalemia, supplemented    D/C PLAN: patient anticipated for d/c to SNIF when bed available

## 2018-03-26 NOTE — CONSULT NOTE ADULT - SUBJECTIVE AND OBJECTIVE BOX
Patient is a 77y old  Female who presents with a chief complaint of worsening dementia, family member unable to take care of her (22 Mar 2018 16:36)    HPI:  76 y/o F with PMHx hypertension, diverticulitis, DLD, COPD, GERD, Alzheimers dementia, PSHx colon resection, CCY, brought in by live in boyfriend Steven for worsening mental status and inability to take care of pt. As per phone discussion with Steven, he does all the cooking and cleaning at home, but the pt is becoming more and more difficult to take care of. In the past 3 days she has eaten only 1 egg, sleeps during the daytime and keeps him up throughout the night time, makes less and less sense. All this has been rapidly worsening for past 2 weeks, but in the bigger picture has been going on for 2 months prior. Pt was here in ED on 3/10/2018, brought in by boyfriend for increasing confusion x1 week. She was seen by Psych at that time (Dr Jones), who stated she had moderate to severe dementia in his note, and was d/c home from the ER with outpt followup. Pt herself upon interview is AAOx2, very slow to answer questions, but can follow basic commands. She does not know why she is here, her medical conditions, but denies any complaints/pains. (22 Mar 2018 16:36)      PAST MEDICAL & SURGICAL HISTORY:  Diverticulitis  GERD (gastroesophageal reflux disease)  COPD (chronic obstructive pulmonary disease)  Dementia  Hypercholesterolemia  High blood pressure  History of cholecystectomy  History of colon resection  Acute diverticulitis  Arthrofibrosis of total knee replacement      Hospital Course:  ABHILASH on IV fluids- psych to see to assess meds    TODAY'S SUBJECTIVE & REVIEW OF SYMPTOMS:     Constitutional poor po   Cardio WNL   Resp WNL   GI WNL  Heme WNL  Endo WNL  Skin WNL  MSK WNL  Neuro increasing confusion  Cognitive WNL  Psych WNL  Occ urinary incontinence    MEDICATIONS  (STANDING):  atorvastatin 80 milliGRAM(s) Oral at bedtime  donepezil 10 milliGRAM(s) Oral at bedtime  DULoxetine 60 milliGRAM(s) Oral daily  gabapentin 100 milliGRAM(s) Oral three times a day  heparin  Injectable 5000 Unit(s) SubCutaneous every 8 hours  hydrOXYzine hydrochloride 25 milliGRAM(s) Oral every 6 hours  memantine 10 milliGRAM(s) Oral two times a day  OLANZapine 7.5 milliGRAM(s) Oral at bedtime  pantoprazole    Tablet 40 milliGRAM(s) Oral before breakfast    MEDICATIONS  (PRN):  acetaminophen   Tablet. 650 milliGRAM(s) Oral every 6 hours PRN Moderate Pain (4 - 6)  clonazePAM Tablet 0.5 milliGRAM(s) Oral every 6 hours PRN anxiety, agitation      FAMILY HISTORY:  No pertinent family history in first degree relatives      Allergies    aspirin (Unknown)  penicillins (Unknown)    Intolerances        SOCIAL HISTORY:    [    ] Etoh  [    ] Smoking  [    ] Substance abuse     Home Environment:  [    ] Home Alone  [  x  ] Lives with Family  BOYFRIEND  [    ] Home Health Aid    Dwelling:  [  x  ] Apartment  [    ] Private House  [    ] Adult Home  [    ] Skilled Nursing Facility      [    ] Short Term  [    ] Long Term  [    ] Stairs                           [  x  ] Elevator     FUNCTIONAL STATUS PTA: (Check all that apply)  Ambulation: [     ]Independent    [  x  ] Dependent     [    ] Non-Ambulatory  Assistive Device: [    ] SA Cane  [    ]  Q Cane  [    ] Walker  [    ]  Wheelchair  ADL : [    ] Independent  [   x ]  Dependent   BOYFRIEND ASSISTS WITH CARE    Vital Signs Last 24 Hrs  T(C): 35.6 (26 Mar 2018 13:28), Max: 36.6 (26 Mar 2018 04:40)  T(F): 96.1 (26 Mar 2018 13:28), Max: 97.9 (26 Mar 2018 04:40)  HR: 66 (26 Mar 2018 13:28) (50 - 70)  BP: 113/56 (26 Mar 2018 13:28) (104/52 - 120/59)  BP(mean): --  RR: 18 (26 Mar 2018 13:28) (18 - 18)  SpO2: --      PHYSICAL EXAM: Alert & Oriented X1  GENERAL: NAD, well-groomed, well-developed  HEAD:  Atraumatic, Normocephalic  EYES: EOMI, PERRLA, conjunctiva and sclera clear  NECK: Supple, No JVD, Normal thyroid  CHEST/LUNG: Clear to percussion bilaterally; No rales, rhonchi, wheezing, or rubs  HEART: Regular rate and rhythm; No murmurs, rubs, or gallops  ABDOMEN: Soft, Nontender, Nondistended; Bowel sounds present  EXTREMITIES:  2+ Peripheral Pulses, No clubbing, cyanosis, or edema    NERVOUS SYSTEM:  Cranial Nerves 2-12 intact [  x ] Abnormal  [    ]  ROM: WFL all extremities [  x  ]  Abnormal [     ]  Motor Strength: WFL all extremities  [  x  ]  Abnormal [    ]  Sensation: intact to light touch [ x   ] Abnormal [    ]      FUNCTIONAL STATUS:  Bed Mobility: [   ]  Independent [    ]  Supervision [   x ]  Needs Assistance [  ]  N/A  Transfers: [    ]  Independent [    ]  Supervision [ x   ]  Needs Assistance [    ]  N/A    Ambulation:  [    ]  Independent [    ]  Supervision [  x  ]  Needs Assistance [    ]  N/A   ADL:  [    ]   Independent [  x  ] Requires Assistance [    ] N/A   GOOD LONG SITTING BALANCE- CONFUSED- ONE ARM ASSIST WITH TRANSFERS/AMBULATION    LABS:    03-26    145  |  107  |  21<H>  ----------------------------<  90  3.2<L>   |  26  |  0.7    Ca    8.5      26 Mar 2018 09:21            RADIOLOGY & ADDITIONAL STUDIES:

## 2018-03-26 NOTE — CHART NOTE - NSCHARTNOTEFT_GEN_A_CORE
Registered Dietitian 3 Day Calorie Count Results    Calorie Count hung 3/23-3/25 - results due 3/26. Calorie Count improperly documented as food are only listed for Dinner of Day 3. Intake varied per Calorie Count from 0-100%. Full nutrition follow up due tomorrow, 3/27.

## 2018-03-26 NOTE — PROGRESS NOTE ADULT - ASSESSMENT
76 y/o F with PMHx hypertension, diverticulitis, DLD, COPD, GERD, Alzheimers dementia, PSHx colon resection,  c/w worsening mental status and inability of family to take care of pt. at home. Continue current medical therapy, f/up  for SNIF placement.   Mild elevation of CK - decreased post po fluid intake.   Mild Hypokalemia, supplemented    D/C PLAN: patient anticipated for d/c to SNIF when bed available

## 2018-03-26 NOTE — CONSULT NOTE ADULT - ASSESSMENT
IMPRESSION: Rehab of gait ab /dementia    PRECAUTIONS: [  x  ] Cardiac  [    ] Respiratory  [    ] Seizures [    ] Contact Isolation  [    ] Droplet Isolation  [    ] Other    Weight Bearing Status:     RECOMMENDATION:    Out of Bed to Chair     DVT/Decubiti Prophylaxis    REHAB PLAN:     [   x  ] Bedside P/T 3-5 times a week   [     ] Bedside O/T  2-3 times a week   [     ] No Rehab Therapy Indicated   [     ]  Speech Therapy   Conditioning/ROM                                 ADL  Bed Mobility                                            Conditioning/ROM  Transfers                                                  Bed Mobility  Sitting /Standing Balance                      Transfers                                        Gait Training                                            Sitting/Standing Balance  Stair Training [   ]Applicable                 Home equipment Eval                                                                     Splinting  [   ] Only      GOALS:   ADL   [ x   ]   Independent         Transfers  [  xx  ] Independent            Ambulation  [x    ] Independent     [     ] With device                            [    ]  CG                                               [    ]  CG                                                    [     ] CG                            [    ] Min A                                          [    ] Min A                                                [     ] Min  A          DISCHARGE PLAN:   [     ]  Good candidate for Intensive Rehabilitation/Hospital based-4A [facility]                                             Will tolerate 3hrs Intensive Rehab Daily                                       [  x    ]  Short Term Rehab in Skilled Nursing Facility ?LTC                                       [      ]  Home with Outpatient or VN services                                         [      ]  Possible Candidate for Intensive Hospital based Rehab

## 2018-03-27 LAB
ANION GAP SERPL CALC-SCNC: 13 MMOL/L — SIGNIFICANT CHANGE UP (ref 7–14)
BUN SERPL-MCNC: 16 MG/DL — SIGNIFICANT CHANGE UP (ref 10–20)
CALCIUM SERPL-MCNC: 8.8 MG/DL — SIGNIFICANT CHANGE UP (ref 8.5–10.1)
CHLORIDE SERPL-SCNC: 102 MMOL/L — SIGNIFICANT CHANGE UP (ref 98–110)
CO2 SERPL-SCNC: 27 MMOL/L — SIGNIFICANT CHANGE UP (ref 17–32)
CREAT SERPL-MCNC: 0.7 MG/DL — SIGNIFICANT CHANGE UP (ref 0.7–1.5)
GLUCOSE SERPL-MCNC: 109 MG/DL — HIGH (ref 70–99)
POTASSIUM SERPL-MCNC: 3.3 MMOL/L — LOW (ref 3.5–5)
POTASSIUM SERPL-SCNC: 3.3 MMOL/L — LOW (ref 3.5–5)
SODIUM SERPL-SCNC: 142 MMOL/L — SIGNIFICANT CHANGE UP (ref 135–146)
TROPONIN T SERPL-MCNC: <0.01 NG/ML — SIGNIFICANT CHANGE UP

## 2018-03-27 RX ORDER — LANOLIN ALCOHOL/MO/W.PET/CERES
5 CREAM (GRAM) TOPICAL ONCE
Qty: 0 | Refills: 0 | Status: COMPLETED | OUTPATIENT
Start: 2018-03-27 | End: 2018-03-27

## 2018-03-27 RX ORDER — POTASSIUM CHLORIDE 20 MEQ
40 PACKET (EA) ORAL ONCE
Qty: 0 | Refills: 0 | Status: DISCONTINUED | OUTPATIENT
Start: 2018-03-27 | End: 2018-03-27

## 2018-03-27 RX ADMIN — GABAPENTIN 100 MILLIGRAM(S): 400 CAPSULE ORAL at 19:28

## 2018-03-27 RX ADMIN — Medication 25 MILLIGRAM(S): at 00:44

## 2018-03-27 RX ADMIN — MEMANTINE HYDROCHLORIDE 10 MILLIGRAM(S): 10 TABLET ORAL at 19:31

## 2018-03-27 RX ADMIN — HEPARIN SODIUM 5000 UNIT(S): 5000 INJECTION INTRAVENOUS; SUBCUTANEOUS at 06:48

## 2018-03-27 RX ADMIN — HEPARIN SODIUM 5000 UNIT(S): 5000 INJECTION INTRAVENOUS; SUBCUTANEOUS at 21:09

## 2018-03-27 RX ADMIN — Medication 0.5 MILLIGRAM(S): at 22:35

## 2018-03-27 RX ADMIN — Medication 25 MILLIGRAM(S): at 23:13

## 2018-03-27 RX ADMIN — DULOXETINE HYDROCHLORIDE 60 MILLIGRAM(S): 30 CAPSULE, DELAYED RELEASE ORAL at 11:47

## 2018-03-27 RX ADMIN — HEPARIN SODIUM 5000 UNIT(S): 5000 INJECTION INTRAVENOUS; SUBCUTANEOUS at 19:26

## 2018-03-27 RX ADMIN — Medication 25 MILLIGRAM(S): at 06:49

## 2018-03-27 RX ADMIN — Medication 25 MILLIGRAM(S): at 19:26

## 2018-03-27 RX ADMIN — MEMANTINE HYDROCHLORIDE 10 MILLIGRAM(S): 10 TABLET ORAL at 06:49

## 2018-03-27 RX ADMIN — GABAPENTIN 100 MILLIGRAM(S): 400 CAPSULE ORAL at 21:09

## 2018-03-27 RX ADMIN — GABAPENTIN 100 MILLIGRAM(S): 400 CAPSULE ORAL at 06:47

## 2018-03-27 RX ADMIN — ATORVASTATIN CALCIUM 80 MILLIGRAM(S): 80 TABLET, FILM COATED ORAL at 21:09

## 2018-03-27 RX ADMIN — PANTOPRAZOLE SODIUM 40 MILLIGRAM(S): 20 TABLET, DELAYED RELEASE ORAL at 06:47

## 2018-03-27 RX ADMIN — OLANZAPINE 7.5 MILLIGRAM(S): 15 TABLET, FILM COATED ORAL at 21:09

## 2018-03-27 RX ADMIN — DONEPEZIL HYDROCHLORIDE 10 MILLIGRAM(S): 10 TABLET, FILM COATED ORAL at 21:09

## 2018-03-27 NOTE — PHYSICAL THERAPY INITIAL EVALUATION ADULT - GENERAL OBSERVATIONS, REHAB EVAL
[id number] 45 min Patient encountered seated in chair, in hallway + IV lock, friend present at bed side

## 2018-03-27 NOTE — CHART NOTE - NSCHARTNOTEFT_GEN_A_CORE
Registered Dietitian Follow-Up     Patient Profile Reviewed                           Yes [x]   No []     Nutrition History Previously Obtained        Yes []  No [x]   Pt continues to be confused, disoriented, unable to obtain nutrition hx.       Pertinent Subjective Information: Pt confused, disoriented; spoke to RN who reports recent po ~50%. Calorie count results yesterday show varied po 0-100%.      Pertinent Medical Interventions: Continue current medical therapy, f/up  for SNIF placement. Mild elevation of CK - decreased post po fluid intake. Mild Hypokalemia, supplemented. D/c plan: patient anticipated for d/c to SNIF when bed available.      Diet order: DASH/TLC, Ensure pudding 3 servings 3x/day, Ensure Enlive 3 servings 3x/day (likely error in entering)     Anthropometrics:  - Ht. unable to obtain  - Wt. no new wts  - wt change - unable to assess     Pertinent Lab Data: (3/24) RBC 3.81, H/H 10.7/33.8, (3/26) 3.2, BUN 21     Pertinent Meds: heparin, atorvastatin, pantoprazole      Physical Findings:  - Appearance: confused, disoriented  - GI function: last BM 3/24 per EMR; RN denies GI distress  - Oral/Mouth cavity: RN denies issues chewing/swallowing  - Skin: intact, BS = 16     Nutrition Requirements  Weight Used: 58.1 kg, 128 lbs     Estimated Energy Needs    Continue [x] 3/23 Adjust []  (8444-1117 kcal/day = 25-30 kcal/kg)      Estimated Protein Needs    Continue [x] 3/23 Adjust []  (58-70 g/day = 1.0-1.2 g/kg)        Estimated Fluid Needs        Continue [x] 3/23 Adjust []  1 ml : 1kcal     Nutrient Intake: not meeting needs     [] Previous Nutrition Diagnosis: Inadequate oral intake            [x] Ongoing          [] Resolved    Nutrition Intervention Meals and Snacks, Medical Food Supplements, Vitamins and Minerals  Continue current diet and change supplement order to Ensure pudding TID and Ensure Enlive TID. Consider MVI q24 hrs.      Goal/Expected Outcome: Pt to consume >50% of meals, snacks, supplements within 3 days.     Indicator/Monitoring: RD to monitor diet order, energy intake, body composition, labs, nutrition focused physical findings.

## 2018-03-28 LAB
ANION GAP SERPL CALC-SCNC: 14 MMOL/L — SIGNIFICANT CHANGE UP (ref 7–14)
BUN SERPL-MCNC: 12 MG/DL — SIGNIFICANT CHANGE UP (ref 10–20)
CALCIUM SERPL-MCNC: 8.6 MG/DL — SIGNIFICANT CHANGE UP (ref 8.5–10.1)
CHLORIDE SERPL-SCNC: 105 MMOL/L — SIGNIFICANT CHANGE UP (ref 98–110)
CO2 SERPL-SCNC: 26 MMOL/L — SIGNIFICANT CHANGE UP (ref 17–32)
CREAT SERPL-MCNC: 0.8 MG/DL — SIGNIFICANT CHANGE UP (ref 0.7–1.5)
CULTURE RESULTS: SIGNIFICANT CHANGE UP
GLUCOSE SERPL-MCNC: 137 MG/DL — HIGH (ref 70–99)
POTASSIUM SERPL-MCNC: 3.1 MMOL/L — LOW (ref 3.5–5)
POTASSIUM SERPL-SCNC: 3.1 MMOL/L — LOW (ref 3.5–5)
SODIUM SERPL-SCNC: 145 MMOL/L — SIGNIFICANT CHANGE UP (ref 135–146)
SPECIMEN SOURCE: SIGNIFICANT CHANGE UP

## 2018-03-28 RX ORDER — SENNA PLUS 8.6 MG/1
1 TABLET ORAL DAILY
Qty: 0 | Refills: 0 | Status: DISCONTINUED | OUTPATIENT
Start: 2018-03-28 | End: 2018-04-02

## 2018-03-28 RX ORDER — POTASSIUM CHLORIDE 20 MEQ
40 PACKET (EA) ORAL EVERY 4 HOURS
Qty: 0 | Refills: 0 | Status: COMPLETED | OUTPATIENT
Start: 2018-03-28 | End: 2018-03-28

## 2018-03-28 RX ORDER — POTASSIUM CHLORIDE 20 MEQ
20 PACKET (EA) ORAL DAILY
Qty: 0 | Refills: 0 | Status: DISCONTINUED | OUTPATIENT
Start: 2018-03-29 | End: 2018-04-02

## 2018-03-28 RX ORDER — LANOLIN ALCOHOL/MO/W.PET/CERES
5 CREAM (GRAM) TOPICAL ONCE
Qty: 0 | Refills: 0 | Status: COMPLETED | OUTPATIENT
Start: 2018-03-28 | End: 2018-03-28

## 2018-03-28 RX ADMIN — Medication 0.5 MILLIGRAM(S): at 19:50

## 2018-03-28 RX ADMIN — HEPARIN SODIUM 5000 UNIT(S): 5000 INJECTION INTRAVENOUS; SUBCUTANEOUS at 05:54

## 2018-03-28 RX ADMIN — Medication 5 MILLIGRAM(S): at 21:08

## 2018-03-28 RX ADMIN — MEMANTINE HYDROCHLORIDE 10 MILLIGRAM(S): 10 TABLET ORAL at 05:55

## 2018-03-28 RX ADMIN — MEMANTINE HYDROCHLORIDE 10 MILLIGRAM(S): 10 TABLET ORAL at 17:09

## 2018-03-28 RX ADMIN — PANTOPRAZOLE SODIUM 40 MILLIGRAM(S): 20 TABLET, DELAYED RELEASE ORAL at 06:01

## 2018-03-28 RX ADMIN — Medication 40 MILLIEQUIVALENT(S): at 13:19

## 2018-03-28 RX ADMIN — HEPARIN SODIUM 5000 UNIT(S): 5000 INJECTION INTRAVENOUS; SUBCUTANEOUS at 21:08

## 2018-03-28 RX ADMIN — Medication 25 MILLIGRAM(S): at 17:09

## 2018-03-28 RX ADMIN — GABAPENTIN 100 MILLIGRAM(S): 400 CAPSULE ORAL at 05:54

## 2018-03-28 RX ADMIN — DONEPEZIL HYDROCHLORIDE 10 MILLIGRAM(S): 10 TABLET, FILM COATED ORAL at 21:08

## 2018-03-28 RX ADMIN — HEPARIN SODIUM 5000 UNIT(S): 5000 INJECTION INTRAVENOUS; SUBCUTANEOUS at 13:20

## 2018-03-28 RX ADMIN — SENNA PLUS 1 TABLET(S): 8.6 TABLET ORAL at 14:50

## 2018-03-28 RX ADMIN — Medication 5 MILLIGRAM(S): at 00:22

## 2018-03-28 RX ADMIN — Medication 25 MILLIGRAM(S): at 23:46

## 2018-03-28 RX ADMIN — DULOXETINE HYDROCHLORIDE 60 MILLIGRAM(S): 30 CAPSULE, DELAYED RELEASE ORAL at 13:19

## 2018-03-28 RX ADMIN — Medication 25 MILLIGRAM(S): at 13:58

## 2018-03-28 RX ADMIN — GABAPENTIN 100 MILLIGRAM(S): 400 CAPSULE ORAL at 13:20

## 2018-03-28 RX ADMIN — ATORVASTATIN CALCIUM 80 MILLIGRAM(S): 80 TABLET, FILM COATED ORAL at 21:09

## 2018-03-28 RX ADMIN — GABAPENTIN 100 MILLIGRAM(S): 400 CAPSULE ORAL at 21:08

## 2018-03-28 RX ADMIN — OLANZAPINE 7.5 MILLIGRAM(S): 15 TABLET, FILM COATED ORAL at 21:08

## 2018-03-28 RX ADMIN — Medication 40 MILLIEQUIVALENT(S): at 10:31

## 2018-03-28 RX ADMIN — Medication 25 MILLIGRAM(S): at 05:54

## 2018-03-28 NOTE — PROGRESS NOTE ADULT - ASSESSMENT
1) Dementia with behavioral distrubances.  Continue current medical therapy, f/up  for SNIF placement.     2) Mild Hypokalemia, supplemented    D/C PLAN: patient anticipated for d/c to SNIF when bed available

## 2018-03-28 NOTE — PROGRESS NOTE ADULT - SUBJECTIVE AND OBJECTIVE BOX
SUBJECTIVE:    Patient is a 77y old Female who presents with a chief complaint of worsening dementia, family member unable to take care of her (22 Mar 2018 16:36)    Currently admitted to medicine with the primary diagnosis of dementia.     Today is hospital day 6d. This morning she is resting comfortably in bed and reports no new issues or overnight events.     PAST MEDICAL & SURGICAL HISTORY  Diverticulitis  GERD (gastroesophageal reflux disease)  COPD (chronic obstructive pulmonary disease)  Dementia  Hypercholesterolemia  High blood pressure  History of cholecystectomy  History of colon resection  Acute diverticulitis  Arthrofibrosis of total knee replacement    SOCIAL HISTORY:  Negative for smoking/alcohol/drug use.     ALLERGIES:  aspirin (Unknown)  penicillins (Unknown)    MEDICATIONS:  STANDING MEDICATIONS  atorvastatin 80 milliGRAM(s) Oral at bedtime  donepezil 10 milliGRAM(s) Oral at bedtime  DULoxetine 60 milliGRAM(s) Oral daily  gabapentin 100 milliGRAM(s) Oral three times a day  heparin  Injectable 5000 Unit(s) SubCutaneous every 8 hours  hydrOXYzine hydrochloride 25 milliGRAM(s) Oral every 6 hours  memantine 10 milliGRAM(s) Oral two times a day  OLANZapine 7.5 milliGRAM(s) Oral at bedtime  pantoprazole    Tablet 40 milliGRAM(s) Oral before breakfast    PRN MEDICATIONS  acetaminophen   Tablet. 650 milliGRAM(s) Oral every 6 hours PRN  clonazePAM Tablet 0.5 milliGRAM(s) Oral every 6 hours PRN    VITALS:   T(F): 98  HR: 60  BP: 127/60  RR: 16  SpO2: 99%    LABS:    03-28    145  |  105  |  12  ----------------------------<  137<H>  3.1<L>   |  26  |  0.8    Ca    8.6      28 Mar 2018 11:26    CARDIAC MARKERS ( 27 Mar 2018 10:54 )  x     / <0.01 ng/mL / x     / x     / x          RADIOLOGY:    no new imaging    PHYSICAL EXAM:  GEN: No acute distress  LUNGS: Clear to auscultation bilaterally   HEART: S1/S2 present. RRR.   ABD: Soft, non-tender, non-distended. Bowel sounds present  EXT: NC/NC/NE/2+PP/ANAYA  NEURO: AAOX2

## 2018-03-29 RX ORDER — POTASSIUM CHLORIDE 20 MEQ
20 PACKET (EA) ORAL DAILY
Qty: 0 | Refills: 0 | Status: DISCONTINUED | OUTPATIENT
Start: 2018-03-29 | End: 2018-03-29

## 2018-03-29 RX ORDER — HALOPERIDOL DECANOATE 100 MG/ML
0.5 INJECTION INTRAMUSCULAR ONCE
Qty: 0 | Refills: 0 | Status: COMPLETED | OUTPATIENT
Start: 2018-03-29 | End: 2018-03-29

## 2018-03-29 RX ADMIN — PANTOPRAZOLE SODIUM 40 MILLIGRAM(S): 20 TABLET, DELAYED RELEASE ORAL at 06:02

## 2018-03-29 RX ADMIN — Medication 25 MILLIGRAM(S): at 17:42

## 2018-03-29 RX ADMIN — GABAPENTIN 100 MILLIGRAM(S): 400 CAPSULE ORAL at 21:26

## 2018-03-29 RX ADMIN — Medication 25 MILLIGRAM(S): at 05:42

## 2018-03-29 RX ADMIN — Medication 25 MILLIGRAM(S): at 12:10

## 2018-03-29 RX ADMIN — GABAPENTIN 100 MILLIGRAM(S): 400 CAPSULE ORAL at 13:57

## 2018-03-29 RX ADMIN — Medication 20 MILLIEQUIVALENT(S): at 11:14

## 2018-03-29 RX ADMIN — ATORVASTATIN CALCIUM 80 MILLIGRAM(S): 80 TABLET, FILM COATED ORAL at 21:27

## 2018-03-29 RX ADMIN — SENNA PLUS 1 TABLET(S): 8.6 TABLET ORAL at 11:14

## 2018-03-29 RX ADMIN — MEMANTINE HYDROCHLORIDE 10 MILLIGRAM(S): 10 TABLET ORAL at 05:42

## 2018-03-29 RX ADMIN — HALOPERIDOL DECANOATE 0.5 MILLIGRAM(S): 100 INJECTION INTRAMUSCULAR at 15:57

## 2018-03-29 RX ADMIN — HEPARIN SODIUM 5000 UNIT(S): 5000 INJECTION INTRAVENOUS; SUBCUTANEOUS at 13:57

## 2018-03-29 RX ADMIN — HEPARIN SODIUM 5000 UNIT(S): 5000 INJECTION INTRAVENOUS; SUBCUTANEOUS at 21:26

## 2018-03-29 RX ADMIN — OLANZAPINE 7.5 MILLIGRAM(S): 15 TABLET, FILM COATED ORAL at 21:27

## 2018-03-29 RX ADMIN — GABAPENTIN 100 MILLIGRAM(S): 400 CAPSULE ORAL at 05:42

## 2018-03-29 RX ADMIN — DULOXETINE HYDROCHLORIDE 60 MILLIGRAM(S): 30 CAPSULE, DELAYED RELEASE ORAL at 11:14

## 2018-03-29 RX ADMIN — HEPARIN SODIUM 5000 UNIT(S): 5000 INJECTION INTRAVENOUS; SUBCUTANEOUS at 05:42

## 2018-03-29 RX ADMIN — DONEPEZIL HYDROCHLORIDE 10 MILLIGRAM(S): 10 TABLET, FILM COATED ORAL at 21:26

## 2018-03-29 RX ADMIN — MEMANTINE HYDROCHLORIDE 10 MILLIGRAM(S): 10 TABLET ORAL at 17:42

## 2018-03-29 RX ADMIN — Medication 0.5 MILLIGRAM(S): at 13:57

## 2018-03-29 NOTE — PROGRESS NOTE ADULT - ASSESSMENT
1) Dementia with behavioral distrubances.  Continue current medical therapy, f/up  for SNIF placement.       2) Mild Hypokalemia, supplemented    D/C PLAN: patient anticipated for d/c to SNIF when bed available    Care discussed with family.      I was physically present for the key portions of the evaluation and management (E/M) service provided.  I agree with the above history, physical, and plan which I have reviewed and edited where appropriate.

## 2018-03-29 NOTE — PROGRESS NOTE ADULT - SUBJECTIVE AND OBJECTIVE BOX
SUBJECTIVE:    Patient is a 77y old Female who presents with a chief complaint of worsening dementia, family member unable to take care of her (22 Mar 2018 16:36)    Currently admitted to medicine with the primary diagnosis of advanced dementia.     Today is hospital day 7d. This morning she is resting comfortably in bed and reports no new issues or overnight events.     PAST MEDICAL & SURGICAL HISTORY  Diverticulitis  GERD (gastroesophageal reflux disease)  COPD (chronic obstructive pulmonary disease)  Dementia  Hypercholesterolemia  High blood pressure  History of cholecystectomy  History of colon resection  Acute diverticulitis  Arthrofibrosis of total knee replacement    SOCIAL HISTORY:  Negative for smoking/alcohol/drug use.     ALLERGIES:  aspirin (Unknown)  penicillins (Unknown)    MEDICATIONS:  STANDING MEDICATIONS  atorvastatin 80 milliGRAM(s) Oral at bedtime  donepezil 10 milliGRAM(s) Oral at bedtime  DULoxetine 60 milliGRAM(s) Oral daily  gabapentin 100 milliGRAM(s) Oral three times a day  heparin  Injectable 5000 Unit(s) SubCutaneous every 8 hours  hydrOXYzine hydrochloride 25 milliGRAM(s) Oral every 6 hours  memantine 10 milliGRAM(s) Oral two times a day  OLANZapine 7.5 milliGRAM(s) Oral at bedtime  pantoprazole    Tablet 40 milliGRAM(s) Oral before breakfast  potassium chloride    Tablet ER 20 milliEquivalent(s) Oral daily  senna 1 Tablet(s) Oral daily    PRN MEDICATIONS  acetaminophen   Tablet. 650 milliGRAM(s) Oral every 6 hours PRN  clonazePAM Tablet 0.5 milliGRAM(s) Oral every 6 hours PRN    VITALS:   T(F): 97.5  HR: 50  BP: 105/59  RR: 18  SpO2: 98%    LABS:    03-28    145  |  105  |  12  ----------------------------<  137<H>  3.1<L>   |  26  |  0.8    Ca    8.6      28 Mar 2018 11:26    RADIOLOGY:    no new imaging    PHYSICAL EXAM:  GEN: No acute distress; sitting comfortably in chair  LUNGS: Clear to auscultation bilaterally   HEART: S1/S2 present. RRR.   ABD: Soft, non-tender, non-distended. Bowel sounds present  EXT: NC/NC/NE/2+PP/ANAYA  NEURO: AAOX2

## 2018-03-30 RX ORDER — CLONAZEPAM 1 MG
0.5 TABLET ORAL EVERY 6 HOURS
Qty: 0 | Refills: 0 | Status: DISCONTINUED | OUTPATIENT
Start: 2018-03-30 | End: 2018-04-02

## 2018-03-30 RX ORDER — HALOPERIDOL DECANOATE 100 MG/ML
1 INJECTION INTRAMUSCULAR ONCE
Qty: 0 | Refills: 0 | Status: COMPLETED | OUTPATIENT
Start: 2018-03-30 | End: 2018-03-30

## 2018-03-30 RX ORDER — HALOPERIDOL DECANOATE 100 MG/ML
1 INJECTION INTRAMUSCULAR EVERY 4 HOURS
Qty: 0 | Refills: 0 | Status: DISCONTINUED | OUTPATIENT
Start: 2018-03-30 | End: 2018-04-02

## 2018-03-30 RX ADMIN — MEMANTINE HYDROCHLORIDE 10 MILLIGRAM(S): 10 TABLET ORAL at 05:44

## 2018-03-30 RX ADMIN — ATORVASTATIN CALCIUM 80 MILLIGRAM(S): 80 TABLET, FILM COATED ORAL at 22:32

## 2018-03-30 RX ADMIN — Medication 25 MILLIGRAM(S): at 18:17

## 2018-03-30 RX ADMIN — Medication 0.5 MILLIGRAM(S): at 01:13

## 2018-03-30 RX ADMIN — HEPARIN SODIUM 5000 UNIT(S): 5000 INJECTION INTRAVENOUS; SUBCUTANEOUS at 22:30

## 2018-03-30 RX ADMIN — DONEPEZIL HYDROCHLORIDE 10 MILLIGRAM(S): 10 TABLET, FILM COATED ORAL at 22:31

## 2018-03-30 RX ADMIN — Medication 25 MILLIGRAM(S): at 23:55

## 2018-03-30 RX ADMIN — MEMANTINE HYDROCHLORIDE 10 MILLIGRAM(S): 10 TABLET ORAL at 18:17

## 2018-03-30 RX ADMIN — DULOXETINE HYDROCHLORIDE 60 MILLIGRAM(S): 30 CAPSULE, DELAYED RELEASE ORAL at 11:47

## 2018-03-30 RX ADMIN — Medication 25 MILLIGRAM(S): at 05:43

## 2018-03-30 RX ADMIN — SENNA PLUS 1 TABLET(S): 8.6 TABLET ORAL at 11:48

## 2018-03-30 RX ADMIN — OLANZAPINE 7.5 MILLIGRAM(S): 15 TABLET, FILM COATED ORAL at 22:32

## 2018-03-30 RX ADMIN — GABAPENTIN 100 MILLIGRAM(S): 400 CAPSULE ORAL at 05:43

## 2018-03-30 RX ADMIN — GABAPENTIN 100 MILLIGRAM(S): 400 CAPSULE ORAL at 15:08

## 2018-03-30 RX ADMIN — HEPARIN SODIUM 5000 UNIT(S): 5000 INJECTION INTRAVENOUS; SUBCUTANEOUS at 15:08

## 2018-03-30 RX ADMIN — Medication 20 MILLIEQUIVALENT(S): at 11:48

## 2018-03-30 RX ADMIN — GABAPENTIN 100 MILLIGRAM(S): 400 CAPSULE ORAL at 22:32

## 2018-03-30 RX ADMIN — PANTOPRAZOLE SODIUM 40 MILLIGRAM(S): 20 TABLET, DELAYED RELEASE ORAL at 07:30

## 2018-03-30 RX ADMIN — Medication 25 MILLIGRAM(S): at 11:48

## 2018-03-30 RX ADMIN — Medication 25 MILLIGRAM(S): at 00:24

## 2018-03-30 NOTE — CHART NOTE - NSCHARTNOTEFT_GEN_A_CORE
Registered Dietitian Follow-Up     Patient Profile Reviewed                           Yes [x]   No []     Nutrition History Previously Obtained        Yes []  No [x]   Pt continues to be confused, disoriented, unable to obtain nutrition hx.       Pertinent Subjective Information: Pt confused, disoriented; spoke to RN who reports recent po very good, >75% - "patient been eating like a horse!"; RN reports pt really likes Ensure Enlive Vanilla      Pertinent Medical Interventions: Dementia with behavioral distrubances.  Continue current medical therapy, f/up   for SNIF placement.  may need to increase dose of haldol if persistently agitated. Mild Hypokalemia, supplemented. d/c PLAN: patient anticipated for d/c to SNIF when bed available.     Diet order: DASH/TLC, Ensure pudding 3 servings 3x/day, Ensure Enlive 3 servings 3x/day (error in entering ?)     Anthropometrics:  - Ht. unable to obtain  - Wt. no new wts  - wt change - unable to assess     Pertinent Lab Data: (3/28) potassium 3.1, glucose 137     Pertinent Meds: heparin, potassium chloride, senna, atorvastatin, pantoprazole      Physical Findings:  - Appearance: confused, disoriented  - GI function: last BM 3/26 per EMR; RN reports pt with soft/nondistended abdomen   - Oral/Mouth cavity: RN denies issues chewing/swallowing  - Skin: intact, BS = 17     Nutrition Requirements  Weight Used: 58.1 kg, 128 lbs     Estimated Energy Needs    Continue [x] 3/23 Adjust []  (3981-5859 kcal/day = 25-30 kcal/kg)      Estimated Protein Needs    Continue [x] 3/23 Adjust []  (58-70 g/day = 1.0-1.2 g/kg)     Estimated Fluid Needs        Continue [x] 3/23 Adjust []  1 ml : 1kcal     Nutrient Intake: meeting needs     [] Previous Nutrition Diagnosis: Inadequate oral intake            [] Ongoing          [x] Resolved    Nutrition Intervention Meals and Snacks, Medical Food Supplements  Continue current diet order and change supplement order to Ensure Enlive Vanilla TID.      Goal/Expected Outcome: Pt continue to consume >75% of meals, snacks, supplements within 7 days.     Indicator/Monitoring: RD to monitor diet order, energy intake, body composition, labs, nutrition focused physical findings.

## 2018-03-30 NOTE — PROGRESS NOTE ADULT - ASSESSMENT
1) Dementia with behavioral distrubances.  Continue current medical therapy, f/up  for SNIF placement.  may need to increase dose of haldol if persistently agitated.     2) Mild Hypokalemia, supplemented    D/C PLAN: patient anticipated for d/c to SNIF when bed available

## 2018-03-30 NOTE — PROGRESS NOTE ADULT - SUBJECTIVE AND OBJECTIVE BOX
SUBJECTIVE:    Patient is a 77y old Female who presents with a chief complaint of worsening dementia, family member unable to take care of her (22 Mar 2018 16:36)    Currently admitted to medicine with the primary diagnosis of Weakness.     Today is hospital day 8d. This morning she is resting comfortably in bed and reports no new issues or overnight events. Was agitated last night, requiring 0.5 mg haldol PO.    PAST MEDICAL & SURGICAL HISTORY  Diverticulitis  GERD (gastroesophageal reflux disease)  COPD (chronic obstructive pulmonary disease)  Dementia  Hypercholesterolemia  High blood pressure  History of cholecystectomy  History of colon resection  Acute diverticulitis  Arthrofibrosis of total knee replacement    SOCIAL HISTORY:  Negative for smoking/alcohol/drug use.     ALLERGIES:  aspirin (Unknown)  penicillins (Unknown)    MEDICATIONS:  STANDING MEDICATIONS  atorvastatin 80 milliGRAM(s) Oral at bedtime  donepezil 10 milliGRAM(s) Oral at bedtime  DULoxetine 60 milliGRAM(s) Oral daily  gabapentin 100 milliGRAM(s) Oral three times a day  haloperidol     Tablet 1 milliGRAM(s) Oral once  heparin  Injectable 5000 Unit(s) SubCutaneous every 8 hours  hydrOXYzine hydrochloride 25 milliGRAM(s) Oral every 6 hours  memantine 10 milliGRAM(s) Oral two times a day  OLANZapine 7.5 milliGRAM(s) Oral at bedtime  pantoprazole    Tablet 40 milliGRAM(s) Oral before breakfast  potassium chloride    Tablet ER 20 milliEquivalent(s) Oral daily  senna 1 Tablet(s) Oral daily    PRN MEDICATIONS  acetaminophen   Tablet. 650 milliGRAM(s) Oral every 6 hours PRN  clonazePAM Tablet 0.5 milliGRAM(s) Oral every 6 hours PRN    VITALS:   T(F): 99.1  HR: 92  BP: 130/77  RR: 20  SpO2: --    LABS:    no new labs    RADIOLOGY:    no new imaging    PHYSICAL EXAM:  GEN: No acute distress  LUNGS: Clear to auscultation bilaterally   HEART: S1/S2 present. RRR.   ABD: Soft, non-tender, non-distended. Bowel sounds present  EXT: NC/NC/NE/2+PP/ANAYA  NEURO: AAOX2

## 2018-03-31 RX ADMIN — DONEPEZIL HYDROCHLORIDE 10 MILLIGRAM(S): 10 TABLET, FILM COATED ORAL at 21:40

## 2018-03-31 RX ADMIN — Medication 650 MILLIGRAM(S): at 09:30

## 2018-03-31 RX ADMIN — Medication 25 MILLIGRAM(S): at 05:59

## 2018-03-31 RX ADMIN — DULOXETINE HYDROCHLORIDE 60 MILLIGRAM(S): 30 CAPSULE, DELAYED RELEASE ORAL at 11:46

## 2018-03-31 RX ADMIN — ATORVASTATIN CALCIUM 80 MILLIGRAM(S): 80 TABLET, FILM COATED ORAL at 21:40

## 2018-03-31 RX ADMIN — HALOPERIDOL DECANOATE 1 MILLIGRAM(S): 100 INJECTION INTRAMUSCULAR at 00:30

## 2018-03-31 RX ADMIN — GABAPENTIN 100 MILLIGRAM(S): 400 CAPSULE ORAL at 14:43

## 2018-03-31 RX ADMIN — GABAPENTIN 100 MILLIGRAM(S): 400 CAPSULE ORAL at 05:59

## 2018-03-31 RX ADMIN — GABAPENTIN 100 MILLIGRAM(S): 400 CAPSULE ORAL at 21:39

## 2018-03-31 RX ADMIN — HALOPERIDOL DECANOATE 1 MILLIGRAM(S): 100 INJECTION INTRAMUSCULAR at 15:06

## 2018-03-31 RX ADMIN — SENNA PLUS 1 TABLET(S): 8.6 TABLET ORAL at 11:47

## 2018-03-31 RX ADMIN — Medication 650 MILLIGRAM(S): at 08:59

## 2018-03-31 RX ADMIN — Medication 0.5 MILLIGRAM(S): at 12:46

## 2018-03-31 RX ADMIN — Medication 25 MILLIGRAM(S): at 18:04

## 2018-03-31 RX ADMIN — HEPARIN SODIUM 5000 UNIT(S): 5000 INJECTION INTRAVENOUS; SUBCUTANEOUS at 14:45

## 2018-03-31 RX ADMIN — HEPARIN SODIUM 5000 UNIT(S): 5000 INJECTION INTRAVENOUS; SUBCUTANEOUS at 21:40

## 2018-03-31 RX ADMIN — HALOPERIDOL DECANOATE 1 MILLIGRAM(S): 100 INJECTION INTRAMUSCULAR at 08:56

## 2018-03-31 RX ADMIN — Medication 20 MILLIEQUIVALENT(S): at 11:47

## 2018-03-31 RX ADMIN — Medication 25 MILLIGRAM(S): at 23:22

## 2018-03-31 RX ADMIN — OLANZAPINE 7.5 MILLIGRAM(S): 15 TABLET, FILM COATED ORAL at 21:40

## 2018-03-31 RX ADMIN — PANTOPRAZOLE SODIUM 40 MILLIGRAM(S): 20 TABLET, DELAYED RELEASE ORAL at 06:05

## 2018-03-31 RX ADMIN — MEMANTINE HYDROCHLORIDE 10 MILLIGRAM(S): 10 TABLET ORAL at 18:04

## 2018-03-31 RX ADMIN — HEPARIN SODIUM 5000 UNIT(S): 5000 INJECTION INTRAVENOUS; SUBCUTANEOUS at 06:00

## 2018-03-31 RX ADMIN — MEMANTINE HYDROCHLORIDE 10 MILLIGRAM(S): 10 TABLET ORAL at 06:04

## 2018-03-31 RX ADMIN — Medication 25 MILLIGRAM(S): at 11:47

## 2018-03-31 RX ADMIN — Medication 650 MILLIGRAM(S): at 15:11

## 2018-03-31 RX ADMIN — Medication 650 MILLIGRAM(S): at 14:41

## 2018-03-31 NOTE — PROGRESS NOTE ADULT - SUBJECTIVE AND OBJECTIVE BOX
SUBJECTIVE:    Patient is a 77y old Female who presents with a chief complaint of worsening dementia, family member unable to take care of her (22 Mar 2018 16:36)    Currently admitted to medicine with the primary diagnosis of dementia.     Today is hospital day 9d. This morning she is resting comfortably in chair, intermittently yelling that she wants to get up. Otherwise no complaints.    PAST MEDICAL & SURGICAL HISTORY  Diverticulitis  GERD (gastroesophageal reflux disease)  COPD (chronic obstructive pulmonary disease)  Dementia  Hypercholesterolemia  High blood pressure  History of cholecystectomy  History of colon resection  Acute diverticulitis  Arthrofibrosis of total knee replacement    SOCIAL HISTORY:  Negative for smoking/alcohol/drug use.     ALLERGIES:  aspirin (Unknown)  penicillins (Unknown)    MEDICATIONS:  STANDING MEDICATIONS  atorvastatin 80 milliGRAM(s) Oral at bedtime  donepezil 10 milliGRAM(s) Oral at bedtime  DULoxetine 60 milliGRAM(s) Oral daily  gabapentin 100 milliGRAM(s) Oral three times a day  heparin  Injectable 5000 Unit(s) SubCutaneous every 8 hours  hydrOXYzine hydrochloride 25 milliGRAM(s) Oral every 6 hours  memantine 10 milliGRAM(s) Oral two times a day  OLANZapine 7.5 milliGRAM(s) Oral at bedtime  pantoprazole    Tablet 40 milliGRAM(s) Oral before breakfast  potassium chloride    Tablet ER 20 milliEquivalent(s) Oral daily  senna 1 Tablet(s) Oral daily    PRN MEDICATIONS  acetaminophen   Tablet. 650 milliGRAM(s) Oral every 6 hours PRN  clonazePAM Tablet 0.5 milliGRAM(s) Oral every 6 hours PRN  haloperidol     Tablet 1 milliGRAM(s) Oral every 4 hours PRN    VITALS:   T(F): 98.5  HR: 77  BP: 132/65  RR: 20  SpO2: --    LABS:    no new labs    RADIOLOGY:    no new imaging    PHYSICAL EXAM:  GEN: No acute distress  LUNGS: Clear to auscultation bilaterally   HEART: S1/S2 present. RRR.   ABD: Soft, non-tender, non-distended. Bowel sounds present  EXT: NC/NC/NE/2+PP/ANAYA  NEURO: AAOX3 SUBJECTIVE:    Patient is a 77y old Female who presents with a chief complaint of worsening dementia, family member unable to take care of her (22 Mar 2018 16:36)    Currently admitted to medicine with the primary diagnosis of dementia.     Today is hospital day 9d. This morning she is resting comfortably in chair, intermittently yelling that she wants to get up. Otherwise no complaints.    PAST MEDICAL & SURGICAL HISTORY  Diverticulitis  GERD (gastroesophageal reflux disease)  COPD (chronic obstructive pulmonary disease)  Dementia  Hypercholesterolemia  High blood pressure  History of cholecystectomy  History of colon resection  Acute diverticulitis  Arthrofibrosis of total knee replacement    SOCIAL HISTORY:  Negative for smoking/alcohol/drug use.     ALLERGIES:  aspirin (Unknown)  penicillins (Unknown)    MEDICATIONS:  STANDING MEDICATIONS  atorvastatin 80 milliGRAM(s) Oral at bedtime  donepezil 10 milliGRAM(s) Oral at bedtime  DULoxetine 60 milliGRAM(s) Oral daily  gabapentin 100 milliGRAM(s) Oral three times a day  heparin  Injectable 5000 Unit(s) SubCutaneous every 8 hours  hydrOXYzine hydrochloride 25 milliGRAM(s) Oral every 6 hours  memantine 10 milliGRAM(s) Oral two times a day  OLANZapine 7.5 milliGRAM(s) Oral at bedtime  pantoprazole    Tablet 40 milliGRAM(s) Oral before breakfast  potassium chloride    Tablet ER 20 milliEquivalent(s) Oral daily  senna 1 Tablet(s) Oral daily    PRN MEDICATIONS  acetaminophen   Tablet. 650 milliGRAM(s) Oral every 6 hours PRN  clonazePAM Tablet 0.5 milliGRAM(s) Oral every 6 hours PRN  haloperidol     Tablet 1 milliGRAM(s) Oral every 4 hours PRN    VITALS:   T(F): 98.5  HR: 77  BP: 132/65  RR: 20  SpO2: --    LABS:    no new labs    RADIOLOGY:    no new imaging    PHYSICAL EXAM:  GEN: No acute distress  LUNGS: Clear to auscultation bilaterally   HEART: S1/S2 present. RRR.   ABD: Soft, non-tender, non-distended. Bowel sounds present  EXT: NC/NC/NE/2+PP/ANAYA  NEURO: AAOX0

## 2018-03-31 NOTE — PROGRESS NOTE ADULT - ATTENDING COMMENTS
78 y/o F with PMHx hypertension, diverticulitis, DLD, COPD, GERD, Alzheimers dementia, PSHx colon resection,  c/w worsening mental status and inability of family to take care of pt. at home.     S: No c/o CP/SOB.   No new complaints.   All other ROS neg.     Exam : Above corroborated. AAO x 2. Gait has short strides but not quite like parkinson's.    A/P :     1) Dementia with behavioral distrubances.  Continue current medical therapy, f/up  for SNIF placement.       2) Mild Hypokalemia, supplemented    D/C PLAN: patient anticipated for d/c to SNIF when bed available    Care discussed with family.
78 y/o F with PMHx hypertension, diverticulitis, DLD, COPD, GERD, Alzheimers dementia, PSHx colon resection,  c/w worsening mental status and inability of family to take care of pt. at home.     S: No c/o CP/SOB.   No new complaints.   All other ROS neg.     Exam : Above corroborated. AAO x 2. Gait has short strides but not quite like parkinson's.    A/P :     1) Dementia with behavioral distrubances.  Continue current medical therapy, f/up  for SNIF placement.       2) Mild Hypokalemia, supplemented    D/C PLAN: patient anticipated for d/c to SNIF when bed available. Rejected by many facilities d/t her behavioral disturbances from Dementia.
78 y/o F with PMHx hypertension, diverticulitis, DLD, COPD, GERD, Alzheimers dementia, PSHx colon resection,  c/w worsening mental status and inability of family to take care of pt. at home.     S: No c/o CP/SOB.   No new complaints.   All other ROS neg.     Exam : Above corroborated. AAO x 2. Gait has short strides but not quite like parkinson's.    A/P :     1) Dementia with behavioral distrubances.  Continue current medical therapy, f/up  for SNIF placement.       2) Mild Hypokalemia, supplemented    D/C PLAN: patient anticipated for d/c to SNIF when bed available. Rejected by many facilities d/t her behavioral disturbances from Dementia.
76 y/o F with PMHx hypertension, diverticulitis, DLD, COPD, GERD, Alzheimers dementia, PSHx colon resection,  c/w worsening mental status and inability of family to take care of pt. at home.     S: No c/o CP/SOB.   No new complaints.   All other ROS neg.     Exam : Above corroborated. AAO x 2. Gait has short strides but not quite like parkinson's.    A/P :     1) Dementia with behavioral distrubances.  Continue current medical therapy, f/up  for SNIF placement.       2) Mild Hypokalemia, supplemented    D/C PLAN: patient anticipated for d/c to SNIF when bed available    Care discussed with family.
76 y/o F with PMHx hypertension, diverticulitis, DLD, COPD, GERD, Alzheimers dementia, PSHx colon resection,  c/w worsening mental status and inability of family to take care of pt. at home.     S: No c/o CP/SOB.   No new complaints.   All other ROS neg.     Exam : Above corroborated. AAO x 2. Gait has short strides but not quite like parkinson's.    A/P :     1) Dementia with behavioral distrubances.  Continue current medical therapy, f/up  for SNIF placement.  talk to her to calm down when she misbehaves rather than using frequent medications.      2) Mild Hypokalemia, supplemented    D/C PLAN: patient anticipated for d/c to SNIF when bed available. Rejected by many facilities d/t her behavioral disturbances from Dementia.

## 2018-04-01 DIAGNOSIS — G30.9 ALZHEIMER'S DISEASE, UNSPECIFIED: ICD-10-CM

## 2018-04-01 RX ADMIN — Medication 25 MILLIGRAM(S): at 06:40

## 2018-04-01 RX ADMIN — HEPARIN SODIUM 5000 UNIT(S): 5000 INJECTION INTRAVENOUS; SUBCUTANEOUS at 21:22

## 2018-04-01 RX ADMIN — GABAPENTIN 100 MILLIGRAM(S): 400 CAPSULE ORAL at 06:40

## 2018-04-01 RX ADMIN — GABAPENTIN 100 MILLIGRAM(S): 400 CAPSULE ORAL at 15:45

## 2018-04-01 RX ADMIN — Medication 25 MILLIGRAM(S): at 11:13

## 2018-04-01 RX ADMIN — SENNA PLUS 1 TABLET(S): 8.6 TABLET ORAL at 11:12

## 2018-04-01 RX ADMIN — PANTOPRAZOLE SODIUM 40 MILLIGRAM(S): 20 TABLET, DELAYED RELEASE ORAL at 06:39

## 2018-04-01 RX ADMIN — OLANZAPINE 7.5 MILLIGRAM(S): 15 TABLET, FILM COATED ORAL at 21:22

## 2018-04-01 RX ADMIN — HEPARIN SODIUM 5000 UNIT(S): 5000 INJECTION INTRAVENOUS; SUBCUTANEOUS at 15:46

## 2018-04-01 RX ADMIN — HALOPERIDOL DECANOATE 1 MILLIGRAM(S): 100 INJECTION INTRAMUSCULAR at 07:42

## 2018-04-01 RX ADMIN — ATORVASTATIN CALCIUM 80 MILLIGRAM(S): 80 TABLET, FILM COATED ORAL at 21:22

## 2018-04-01 RX ADMIN — GABAPENTIN 100 MILLIGRAM(S): 400 CAPSULE ORAL at 21:21

## 2018-04-01 RX ADMIN — MEMANTINE HYDROCHLORIDE 10 MILLIGRAM(S): 10 TABLET ORAL at 17:27

## 2018-04-01 RX ADMIN — HEPARIN SODIUM 5000 UNIT(S): 5000 INJECTION INTRAVENOUS; SUBCUTANEOUS at 06:40

## 2018-04-01 RX ADMIN — DULOXETINE HYDROCHLORIDE 60 MILLIGRAM(S): 30 CAPSULE, DELAYED RELEASE ORAL at 11:12

## 2018-04-01 RX ADMIN — Medication 25 MILLIGRAM(S): at 23:55

## 2018-04-01 RX ADMIN — Medication 650 MILLIGRAM(S): at 20:54

## 2018-04-01 RX ADMIN — MEMANTINE HYDROCHLORIDE 10 MILLIGRAM(S): 10 TABLET ORAL at 06:40

## 2018-04-01 RX ADMIN — HALOPERIDOL DECANOATE 1 MILLIGRAM(S): 100 INJECTION INTRAMUSCULAR at 16:26

## 2018-04-01 RX ADMIN — Medication 25 MILLIGRAM(S): at 17:28

## 2018-04-01 RX ADMIN — Medication 20 MILLIEQUIVALENT(S): at 11:12

## 2018-04-01 RX ADMIN — DONEPEZIL HYDROCHLORIDE 10 MILLIGRAM(S): 10 TABLET, FILM COATED ORAL at 21:22

## 2018-04-01 RX ADMIN — Medication 0.5 MILLIGRAM(S): at 11:51

## 2018-04-01 RX ADMIN — Medication 650 MILLIGRAM(S): at 20:24

## 2018-04-01 NOTE — PROGRESS NOTE BEHAVIORAL HEALTH - NSBHCHARTREVIEWVS_PSY_A_CORE FT
Vital Signs Last 24 Hrs  T(C): 36.3 (01 Apr 2018 06:40), Max: 36.3 (01 Apr 2018 06:40)  T(F): 97.4 (01 Apr 2018 06:40), Max: 97.4 (01 Apr 2018 06:40)  HR: 79 (01 Apr 2018 06:40) (79 - 107)  BP: 154/71 (01 Apr 2018 06:40) (150/70 - 167/80)  BP(mean): --  RR: 20 (01 Apr 2018 06:40) (20 - 20)  SpO2: --

## 2018-04-01 NOTE — PROGRESS NOTE BEHAVIORAL HEALTH - NSBHFUPINTERVALHXFT_PSY_A_CORE
Pt is a 76 yo F, domiciled with her boyfriend, with a history of neurocognitive decline likely 2/2 alzheimer's dementia. Psychiatry was called to assess the patient due to increasing agitation. Pt was seen and evaluated twice on the medical floor. At the first evaluation, Pt was sitting calmly at bedside. She was slow to answer questions but did not appear to be in any distress. Neurocognitive decline noted as Pt was not oriented to time, situation, or place. Pt was then evaluated this morning. At this time the Pt was sleeping and was not disturbed.   Per nursing: Pt did not exhibit any agitation overnight. Nursing reported that the patient did not require any PRN Klonopin.

## 2018-04-01 NOTE — PROGRESS NOTE BEHAVIORAL HEALTH - PROBLEM SELECTOR PLAN 1
#). Basic needs should be evaluated and pain should be evaluated and addressed.  #). Before prn medication is given, behavioral interventions should be used. Aroma therapy and music therapy have been shown to be beneficial. Regular ambulation / exercise is helpful in reducing agitation / behavioral disturbances.   #). Avoid anticholinergic, antihistaminergic, and benzodiazepine medications as these can worsen confusion and cause agitation to become worse.   #). Advise against using more than one antipsychotic at a time. Olanzapine is used with some efficacy to treat agitation in the setting of dementia. It can be dosed twice a day up to 5mg BID.

## 2018-04-01 NOTE — PROGRESS NOTE BEHAVIORAL HEALTH - NSBHCHARTREVIEWINVESTIGATE_PSY_A_CORE FT
QTC Calculation(Bezet) 441 ms  Diagnosis Line Sinus tachycardia  Possible Left atrial enlargement  Borderline ECG

## 2018-04-01 NOTE — PROGRESS NOTE BEHAVIORAL HEALTH - SUMMARY
Pt is a 78 yo F, domiciled with her boyfriend, with a history of neurocognitive decline likely 2/2 alzheimer's dementia. Psychiatry was called to assess the patient due to increasing agitation. Upon evaluation patient did not exhibit agitation or aggression and nursing reported the Pt did not exhibit agitation overnight. Per chart, the patient has been known to constantly walk around and periodically yell. Causes of behavioral disturbances in the setting of dementia are many and include pain, fear, confusion, and delirium. Medication toxicity is often a culprit as benzodiazepines, anticholinergic medication, and antihistaminergic medication can worsen confusion and disorient the patient contributing to fear.

## 2018-04-01 NOTE — PROGRESS NOTE ADULT - SUBJECTIVE AND OBJECTIVE BOX
S: No new complaints  No CP/SOB      All other pertinent ROS negative.      Vital Signs Last 24 Hrs  T(C): 36.7 (01 Apr 2018 12:31), Max: 36.7 (01 Apr 2018 12:31)  T(F): 98 (01 Apr 2018 12:31), Max: 98 (01 Apr 2018 12:31)  HR: 102 (01 Apr 2018 12:31) (79 - 102)  BP: 144/78 (01 Apr 2018 12:31) (144/78 - 167/80)  BP(mean): --  RR: 18 (01 Apr 2018 12:31) (18 - 20)  SpO2: --  PHYSICAL EXAM:    Constitutional: NAD, awake and alert, well-developed  HEENT: PERR, EOMI, Normal Hearing, MMM  Neck: Soft and supple, No LAD, No JVD  Respiratory: Breath sounds are clear bilaterally, No wheezing, rales or rhonchi  Cardiovascular: S1 and S2, regular rate and rhythm, no Murmurs, gallops or rubs  Gastrointestinal: Bowel Sounds present, soft, nontender, nondistended, no guarding, no rebound  Extremities: No peripheral edema  Vascular: 2+ peripheral pulses  Neurological:dementia  MEDICATIONS:  MEDICATIONS  (STANDING):  atorvastatin 80 milliGRAM(s) Oral at bedtime  donepezil 10 milliGRAM(s) Oral at bedtime  DULoxetine 60 milliGRAM(s) Oral daily  gabapentin 100 milliGRAM(s) Oral three times a day  heparin  Injectable 5000 Unit(s) SubCutaneous every 8 hours  hydrOXYzine hydrochloride 25 milliGRAM(s) Oral every 6 hours  memantine 10 milliGRAM(s) Oral two times a day  OLANZapine 7.5 milliGRAM(s) Oral at bedtime  pantoprazole    Tablet 40 milliGRAM(s) Oral before breakfast  potassium chloride    Tablet ER 20 milliEquivalent(s) Oral daily  senna 1 Tablet(s) Oral daily      LABS: All Labs Reviewed:                Blood Culture:     Radiology: reviewed

## 2018-04-01 NOTE — PROGRESS NOTE BEHAVIORAL HEALTH - NSBHFUPINTERVALCCFT_PSY_A_CORE
Psychiatry was consulted for a follow up regarding medication management with the following question:  advanced dementia, agitation; ok to increase olanzapine or add haldol prn?

## 2018-04-01 NOTE — PROGRESS NOTE BEHAVIORAL HEALTH - NSBHCONSULTOBSREASON_PSY_A_CORE FT
OK to continue constant observation for nursing purposes and for patient safety given advanced dementia

## 2018-04-01 NOTE — PROVIDER CONTACT NOTE (OTHER) - SITUATION
PCA reports to RN that pt's bp is 65/45. Rn assessed and PT manual BP is 90/50.
MD Aquino #[id number] made aware of pt's temp 100.1 F and . Per MD, no intervention needed at this time. Will continue to monitor and assess

## 2018-04-01 NOTE — PROGRESS NOTE ADULT - ASSESSMENT
78 y/o F with PMHx hypertension, diverticulitis, DLD, COPD, GERD, Alzheimers dementia, PSHx colon resection,  c/w worsening mental status and inability of family to take care of pt. at home.     S: No c/o CP/SOB.   No new complaints.   All other ROS neg.     Exam : Above corroborated. AAO x 2. Gait has short strides but not quite like parkinson's.    A/P :     1) Dementia with behavioral distrubances.  Continue current medical therapy, f/up  for SNIF placement.  talk to her to calm down when she misbehaves rather than using frequent medications.      2) Mild Hypokalemia, supplemented    D/C PLAN: patient anticipated for d/c to SNIF when bed available. Rejected by many facilities d/t her behavioral disturbances from Dementia.

## 2018-04-01 NOTE — PROGRESS NOTE BEHAVIORAL HEALTH - PRN MEDS
MEDICATIONS  (PRN):  acetaminophen   Tablet. 650 milliGRAM(s) Oral every 6 hours PRN Moderate Pain (4 - 6)  clonazePAM Tablet 0.5 milliGRAM(s) Oral every 6 hours PRN anxiety  haloperidol     Tablet 1 milliGRAM(s) Oral every 4 hours PRN agitation

## 2018-04-01 NOTE — PROGRESS NOTE BEHAVIORAL HEALTH - NSBHCHARTREVIEWIMAGING_PSY_A_CORE FT
EXAM:  CT BRAIN        PROCEDURE DATE:  03/22/2018    The ventricles and cortical sulci demonstrate stable moderate atrophic   changes.  IMPRESSION:   1.  No evidence of acute intracranial pathology.  Stable exam since   6/16/2014.  2.  Stable mild chronic microvascular changes.

## 2018-04-01 NOTE — PROGRESS NOTE BEHAVIORAL HEALTH - AXIS III
Diverticulitis, GERD, COPD, Hypercholesterolemia, High blood pressure, History of colon resection, Arthrofibrosis of total knee replacement

## 2018-04-02 ENCOUNTER — TRANSCRIPTION ENCOUNTER (OUTPATIENT)
Age: 78
End: 2018-04-02

## 2018-04-02 VITALS
RESPIRATION RATE: 18 BRPM | SYSTOLIC BLOOD PRESSURE: 118 MMHG | DIASTOLIC BLOOD PRESSURE: 62 MMHG | HEART RATE: 84 BPM | TEMPERATURE: 97 F

## 2018-04-02 LAB
ANION GAP SERPL CALC-SCNC: 15 MMOL/L — HIGH (ref 7–14)
BUN SERPL-MCNC: 15 MG/DL — SIGNIFICANT CHANGE UP (ref 10–20)
CALCIUM SERPL-MCNC: 9.6 MG/DL — SIGNIFICANT CHANGE UP (ref 8.5–10.1)
CHLORIDE SERPL-SCNC: 99 MMOL/L — SIGNIFICANT CHANGE UP (ref 98–110)
CO2 SERPL-SCNC: 28 MMOL/L — SIGNIFICANT CHANGE UP (ref 17–32)
CREAT SERPL-MCNC: 0.8 MG/DL — SIGNIFICANT CHANGE UP (ref 0.7–1.5)
GLUCOSE SERPL-MCNC: 134 MG/DL — HIGH (ref 70–99)
POTASSIUM SERPL-MCNC: 5 MMOL/L — SIGNIFICANT CHANGE UP (ref 3.5–5)
POTASSIUM SERPL-SCNC: 5 MMOL/L — SIGNIFICANT CHANGE UP (ref 3.5–5)
SODIUM SERPL-SCNC: 142 MMOL/L — SIGNIFICANT CHANGE UP (ref 135–146)

## 2018-04-02 RX ORDER — HALOPERIDOL DECANOATE 100 MG/ML
1 INJECTION INTRAMUSCULAR
Qty: 0 | Refills: 0 | COMMUNITY
Start: 2018-04-02

## 2018-04-02 RX ORDER — SENNA PLUS 8.6 MG/1
1 TABLET ORAL DAILY
Qty: 0 | Refills: 0 | Status: DISCONTINUED | OUTPATIENT
Start: 2018-04-02 | End: 2018-04-02

## 2018-04-02 RX ORDER — OXYBUTYNIN CHLORIDE 5 MG
5 TABLET ORAL
Qty: 0 | Refills: 0 | Status: DISCONTINUED | OUTPATIENT
Start: 2018-04-02 | End: 2018-04-02

## 2018-04-02 RX ORDER — DONEPEZIL HYDROCHLORIDE 10 MG/1
1 TABLET, FILM COATED ORAL
Qty: 0 | Refills: 0 | COMMUNITY
Start: 2018-04-02

## 2018-04-02 RX ORDER — ACETAMINOPHEN 500 MG
2 TABLET ORAL
Qty: 0 | Refills: 0 | COMMUNITY
Start: 2018-04-02

## 2018-04-02 RX ORDER — SENNA PLUS 8.6 MG/1
1 TABLET ORAL
Qty: 0 | Refills: 0 | COMMUNITY
Start: 2018-04-02

## 2018-04-02 RX ORDER — HALOPERIDOL DECANOATE 100 MG/ML
1 INJECTION INTRAMUSCULAR EVERY 4 HOURS
Qty: 0 | Refills: 0 | Status: DISCONTINUED | OUTPATIENT
Start: 2018-04-02 | End: 2018-04-02

## 2018-04-02 RX ORDER — MEMANTINE HYDROCHLORIDE 10 MG/1
1 TABLET ORAL
Qty: 0 | Refills: 0 | COMMUNITY
Start: 2018-04-02

## 2018-04-02 RX ADMIN — Medication 0.5 MILLIGRAM(S): at 13:53

## 2018-04-02 RX ADMIN — PANTOPRAZOLE SODIUM 40 MILLIGRAM(S): 20 TABLET, DELAYED RELEASE ORAL at 06:06

## 2018-04-02 RX ADMIN — GABAPENTIN 100 MILLIGRAM(S): 400 CAPSULE ORAL at 05:31

## 2018-04-02 RX ADMIN — Medication 20 MILLIEQUIVALENT(S): at 12:17

## 2018-04-02 RX ADMIN — Medication 650 MILLIGRAM(S): at 05:33

## 2018-04-02 RX ADMIN — HALOPERIDOL DECANOATE 1 MILLIGRAM(S): 100 INJECTION INTRAMUSCULAR at 12:28

## 2018-04-02 RX ADMIN — HEPARIN SODIUM 5000 UNIT(S): 5000 INJECTION INTRAVENOUS; SUBCUTANEOUS at 05:31

## 2018-04-02 RX ADMIN — HEPARIN SODIUM 5000 UNIT(S): 5000 INJECTION INTRAVENOUS; SUBCUTANEOUS at 14:46

## 2018-04-02 RX ADMIN — Medication 5 MILLIGRAM(S): at 18:24

## 2018-04-02 RX ADMIN — DULOXETINE HYDROCHLORIDE 60 MILLIGRAM(S): 30 CAPSULE, DELAYED RELEASE ORAL at 12:17

## 2018-04-02 RX ADMIN — GABAPENTIN 100 MILLIGRAM(S): 400 CAPSULE ORAL at 14:45

## 2018-04-02 RX ADMIN — MEMANTINE HYDROCHLORIDE 10 MILLIGRAM(S): 10 TABLET ORAL at 05:31

## 2018-04-02 RX ADMIN — Medication 25 MILLIGRAM(S): at 12:17

## 2018-04-02 RX ADMIN — Medication 25 MILLIGRAM(S): at 05:31

## 2018-04-02 RX ADMIN — Medication 25 MILLIGRAM(S): at 18:19

## 2018-04-02 RX ADMIN — Medication 0.5 MILLIGRAM(S): at 06:59

## 2018-04-02 RX ADMIN — MEMANTINE HYDROCHLORIDE 10 MILLIGRAM(S): 10 TABLET ORAL at 18:19

## 2018-04-02 RX ADMIN — HALOPERIDOL DECANOATE 1 MILLIGRAM(S): 100 INJECTION INTRAMUSCULAR at 00:16

## 2018-04-02 RX ADMIN — SENNA PLUS 1 TABLET(S): 8.6 TABLET ORAL at 12:17

## 2018-04-02 RX ADMIN — SENNA PLUS 1 TABLET(S): 8.6 TABLET ORAL at 18:23

## 2018-04-02 RX ADMIN — Medication 650 MILLIGRAM(S): at 06:03

## 2018-04-02 NOTE — DISCHARGE NOTE ADULT - SECONDARY DIAGNOSIS.
Essential hypertension Diverticulitis Gastroesophageal reflux disease with esophagitis Hypercholesterolemia

## 2018-04-02 NOTE — DISCHARGE NOTE ADULT - PATIENT PORTAL LINK FT
You can access the [facility] Patient Portal, offered by [facility], by registering with the following website: [email or web address]

## 2018-04-02 NOTE — DISCHARGE NOTE ADULT - MEDICATION SUMMARY - MEDICATIONS TO CHANGE
I will SWITCH the dose or number of times a day I take the medications listed below when I get home from the hospital:    amlodipine-benazepril 10 mg-20 mg oral capsule  -- 1 cap(s) by mouth once a day    omeprazole 40 mg oral delayed release capsule  -- 1 cap(s) by mouth once a day

## 2018-04-02 NOTE — DISCHARGE NOTE ADULT - CARE PLAN
Principal Discharge DX:	Alzheimer's dementia with behavioral disturbance, unspecified timing of dementia onset  Goal:	symptomatic treatment  Assessment and plan of treatment:	Continue anti-psychotic medicines, encourage the patient to have medications  Secondary Diagnosis:	Essential hypertension  Goal:	ideal control of less than 140/90  Assessment and plan of treatment:	continue home bp medicine  Secondary Diagnosis:	Diverticulitis  Goal:	resolved  Secondary Diagnosis:	Gastroesophageal reflux disease with esophagitis  Goal:	symptomatic treatment  Assessment and plan of treatment:	continue omeprazole in the morning on empty stomach  Secondary Diagnosis:	Hypercholesterolemia  Goal:	optimum control of blood cholestrol  Assessment and plan of treatment:	continue atorvastatin

## 2018-04-02 NOTE — DISCHARGE NOTE ADULT - CARE PROVIDER_API CALL
Ramiro Falk), Medicine  [address] [place] [id number]  Phone: [phone]  Fax: [phone]    Tabatha Jones), Geriatric Psychiatry; Psychiatry  [address] [place] [address]  Phone: [phone]  Fax: [phone]

## 2018-04-02 NOTE — DISCHARGE NOTE ADULT - MEDICATION SUMMARY - MEDICATIONS TO TAKE
I will START or STAY ON the medications listed below when I get home from the hospital:    clonazePAM 0.5 mg oral tablet  -- orally every 6 hours, As Needed  -- Indication: For Alzheimer's dementia with behavioral disturbance, unspecified timing of dementia onset    gabapentin 100 mg oral capsule  -- 1 tab(s) by mouth 3 times a day  -- Indication: For WEAKNESS DEMENTIA ALZHEIMERS DISEASE    Cymbalta 60 mg oral delayed release capsule  -- 1 cap(s) by mouth once a day  -- Indication: For Alzheimer's dementia with behavioral disturbance, unspecified timing of dementia onset    atorvastatin 80 mg oral tablet  -- 1 tab(s) by mouth once a day  -- Indication: For Dyslipidemia    amlodipine-benazepril 10 mg-20 mg oral capsule  -- 1 cap(s) by mouth once a day  -- Indication: For Dementia    OLANZapine 7.5 mg oral tablet  -- 1 tab(s) by mouth once a day (at bedtime)  -- Indication: For Alzheimer's dementia with behavioral disturbance, unspecified timing of dementia onset    haloperidol 1 mg oral tablet  -- 1 tab(s) by mouth every 4 hours, As needed, agitation  -- Indication: For Alzheimer's dementia with behavioral disturbance, unspecified timing of dementia onset    Atarax 25 mg oral tablet  -- 1 tab(s) by mouth 4 times a day  -- Indication: For Alzheimer's dementia with behavioral disturbance, unspecified timing of dementia onset    Namzaric 28 mg-10 mg oral capsule, extended release  -- 1 cap(s) by mouth once a day  -- Indication: For Alzheimers disease    senna oral tablet  -- 1 tab(s) by mouth once a day  -- Indication: For Constipation    omeprazole 40 mg oral delayed release capsule  -- 1 cap(s) by mouth once a day  -- Indication: For GERD (gastroesophageal reflux disease)    tolterodine 2 mg oral tablet  -- 1 tab by mouth once a day  -- Indication: For overactive bladder

## 2018-04-02 NOTE — DISCHARGE NOTE ADULT - PLAN OF CARE
symptomatic treatment Continue anti-psychotic medicines, encourage the patient to have medications ideal control of less than 140/90 continue home bp medicine resolved continue omeprazole in the morning on empty stomach optimum control of blood cholestrol continue atorvastatin

## 2018-04-02 NOTE — DISCHARGE NOTE ADULT - HOSPITAL COURSE
She was brought in by boyfriend Steven for worsening mental status and inability to take care of pt. As per phone discussion with Steven, he does all the cooking and cleaning at home, but the pt became more and more difficult to take care of. In the past 3 days prior to admission she was up throughout the night time and made less sense.  She was treated here for Dementia with behavioral distrubances. She is pn antipyscotics and anxiolytics due to her underlying condition  We have repeatedly tried to talk to her and calm her down She was brought in by boyfriend Steven for worsening mental status and inability to take care of pt. As per phone discussion with Steven, he does all the cooking and cleaning at home, but the pt became more and more difficult to take care of. In the past 3 days prior to admission she was up throughout the night time and made less sense.  She was treated here for Dementia with behavioral distrubances. She is pn antipyscotics and anxiolytics due to her underlying condition  We have repeatedly tried to talk to her and calm her down.    Attending Note :   PAtient was admitted for placement d/t advanced dementia. Going to NH today.

## 2018-04-02 NOTE — DISCHARGE NOTE ADULT - MEDICATION SUMMARY - MEDICATIONS TO STOP TAKING
I will STOP taking the medications listed below when I get home from the hospital:    Atarax 25 mg oral tablet  -- 1 tab(s) by mouth 4 times a day

## 2018-04-03 ENCOUNTER — OUTPATIENT (OUTPATIENT)
Dept: OUTPATIENT SERVICES | Facility: HOSPITAL | Age: 78
LOS: 1 days | Discharge: HOME | End: 2018-04-03

## 2018-04-03 DIAGNOSIS — Z98.890 OTHER SPECIFIED POSTPROCEDURAL STATES: Chronic | ICD-10-CM

## 2018-04-03 DIAGNOSIS — T84.82XA FIBROSIS DUE TO INTERNAL ORTHOPEDIC PROSTHETIC DEVICES, IMPLANTS AND GRAFTS, INITIAL ENCOUNTER: Chronic | ICD-10-CM

## 2018-04-03 DIAGNOSIS — E55.9 VITAMIN D DEFICIENCY, UNSPECIFIED: ICD-10-CM

## 2018-04-03 DIAGNOSIS — K57.92 DIVERTICULITIS OF INTESTINE, PART UNSPECIFIED, WITHOUT PERFORATION OR ABSCESS WITHOUT BLEEDING: Chronic | ICD-10-CM

## 2018-04-04 DIAGNOSIS — G30.9 ALZHEIMER'S DISEASE, UNSPECIFIED: ICD-10-CM

## 2018-04-04 DIAGNOSIS — Z96.659 PRESENCE OF UNSPECIFIED ARTIFICIAL KNEE JOINT: ICD-10-CM

## 2018-04-04 DIAGNOSIS — E78.00 PURE HYPERCHOLESTEROLEMIA, UNSPECIFIED: ICD-10-CM

## 2018-04-04 DIAGNOSIS — E87.6 HYPOKALEMIA: ICD-10-CM

## 2018-04-04 DIAGNOSIS — E86.0 DEHYDRATION: ICD-10-CM

## 2018-04-04 DIAGNOSIS — J44.9 CHRONIC OBSTRUCTIVE PULMONARY DISEASE, UNSPECIFIED: ICD-10-CM

## 2018-04-04 DIAGNOSIS — K57.90 DIVERTICULOSIS OF INTESTINE, PART UNSPECIFIED, WITHOUT PERFORATION OR ABSCESS WITHOUT BLEEDING: ICD-10-CM

## 2018-04-04 DIAGNOSIS — N18.3 CHRONIC KIDNEY DISEASE, STAGE 3 (MODERATE): ICD-10-CM

## 2018-04-04 DIAGNOSIS — I12.9 HYPERTENSIVE CHRONIC KIDNEY DISEASE WITH STAGE 1 THROUGH STAGE 4 CHRONIC KIDNEY DISEASE, OR UNSPECIFIED CHRONIC KIDNEY DISEASE: ICD-10-CM

## 2018-04-04 DIAGNOSIS — F02.81 DEMENTIA IN OTHER DISEASES CLASSIFIED ELSEWHERE, UNSPECIFIED SEVERITY, WITH BEHAVIORAL DISTURBANCE: ICD-10-CM

## 2018-04-04 DIAGNOSIS — M24.669 ANKYLOSIS, UNSPECIFIED KNEE: ICD-10-CM

## 2018-04-04 DIAGNOSIS — Z90.49 ACQUIRED ABSENCE OF OTHER SPECIFIED PARTS OF DIGESTIVE TRACT: ICD-10-CM

## 2018-04-04 DIAGNOSIS — F20.9 SCHIZOPHRENIA, UNSPECIFIED: ICD-10-CM

## 2018-04-04 DIAGNOSIS — E78.5 HYPERLIPIDEMIA, UNSPECIFIED: ICD-10-CM

## 2018-06-12 ENCOUNTER — EMERGENCY (EMERGENCY)
Facility: HOSPITAL | Age: 78
LOS: 0 days | Discharge: NURSING, REHAB OR LONG-TERM CARE | End: 2018-06-12
Attending: EMERGENCY MEDICINE | Admitting: EMERGENCY MEDICINE

## 2018-06-12 VITALS
SYSTOLIC BLOOD PRESSURE: 152 MMHG | TEMPERATURE: 99 F | RESPIRATION RATE: 20 BRPM | WEIGHT: 130.07 LBS | DIASTOLIC BLOOD PRESSURE: 75 MMHG | HEART RATE: 85 BPM | OXYGEN SATURATION: 100 %

## 2018-06-12 DIAGNOSIS — Z98.890 OTHER SPECIFIED POSTPROCEDURAL STATES: Chronic | ICD-10-CM

## 2018-06-12 DIAGNOSIS — K21.9 GASTRO-ESOPHAGEAL REFLUX DISEASE WITHOUT ESOPHAGITIS: ICD-10-CM

## 2018-06-12 DIAGNOSIS — Z88.0 ALLERGY STATUS TO PENICILLIN: ICD-10-CM
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
## 2018-06-12 DIAGNOSIS — E78.00 PURE HYPERCHOLESTEROLEMIA, UNSPECIFIED: ICD-10-CM
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
## 2018-06-12 DIAGNOSIS — K57.92 DIVERTICULITIS OF INTESTINE, PART UNSPECIFIED, WITHOUT PERFORATION OR ABSCESS WITHOUT BLEEDING: Chronic | ICD-10-CM

## 2018-06-12 DIAGNOSIS — F03.90 UNSPECIFIED DEMENTIA WITHOUT BEHAVIORAL DISTURBANCE: ICD-10-CM

## 2018-06-12 DIAGNOSIS — Z88.6 ALLERGY STATUS TO ANALGESIC AGENT: ICD-10-CM
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
## 2018-06-12 DIAGNOSIS — T84.82XA FIBROSIS DUE TO INTERNAL ORTHOPEDIC PROSTHETIC DEVICES, IMPLANTS AND GRAFTS, INITIAL ENCOUNTER: Chronic | ICD-10-CM

## 2018-06-12 DIAGNOSIS — J44.9 CHRONIC OBSTRUCTIVE PULMONARY DISEASE, UNSPECIFIED: ICD-10-CM
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
## 2018-06-12 DIAGNOSIS — M79.89 OTHER SPECIFIED SOFT TISSUE DISORDERS: ICD-10-CM

## 2018-06-12 DIAGNOSIS — S92.352A DISPLACED FRACTURE OF FIFTH METATARSAL BONE, LEFT FOOT, INITIAL ENCOUNTER FOR CLOSED FRACTURE: ICD-10-CM

## 2018-06-12 RX ADMIN — Medication 0.5 MILLIGRAM(S): at 17:53

## 2018-06-12 NOTE — ED ADULT TRIAGE NOTE - CHIEF COMPLAINT QUOTE
[facility] states "she was home on leave and her left ankle is swollen and she is saying she fell, history of dementia".

## 2018-06-12 NOTE — ED PROVIDER NOTE - ATTENDING CONTRIBUTION TO CARE
I personally evaluated the patient. I reviewed the Resident’s or Physician Assistant’s note (as assigned above), and agree with the findings and plan except as documented in my note.  skin intact NVI

## 2018-06-12 NOTE — ED PROVIDER NOTE - PHYSICAL EXAMINATION
Vital Signs: I have reviewed the initial vital signs.  Constitutional: well-nourished, no acute distress, normocephalic  Eyes: PERRLA, EOMI, no nystagmus, clear conjunctiva  ENT: MMM, TM b/l clear , no nasal congestion  Cardiovascular: regular rate, regular rhythm, no murmur appreciated  Respiratory: unlabored respiratory effort, clear to auscultation bilaterally  Gastrointestinal: soft, non-tender, non-distended  abdomen, no pulsatile mass  Musculoskeletal: supple neck, (+) b/l pedal edema L>R, left foot dorsal aspect with ecchymosis and tenderness over the 5th metatarsal  Integumentary: warm, dry, no rash  Neurologic: awake, alert to person and place, cranial nerves II-XII grossly intact, extremities’ motor and sensory functions grossly intact, no focal deficits, GCS 15  Psychiatric: appropriate mood, appropriate affect Vital Signs: I have reviewed the initial vital signs.  Constitutional: well-nourished, no acute distress, normocephalic  Eyes: PERRLA, EOMI, no nystagmus, clear conjunctiva  ENT: MMM, TM b/l clear , no nasal congestion  Cardiovascular: regular rate, regular rhythm, no murmur appreciated  Musculoskeletal: supple neck, (+) b/l pedal edema L>R, left foot dorsal aspect with ecchymosis and tenderness over the 5th metatarsal dorsal aspect of foot   Integumentary: warm, dry, no rash  Neurologic: awake, alert to person and place,   Psychiatric: appropriate mood,

## 2018-07-04 ENCOUNTER — OUTPATIENT (OUTPATIENT)
Dept: OUTPATIENT SERVICES | Facility: HOSPITAL | Age: 78
LOS: 1 days | Discharge: HOME | End: 2018-07-04

## 2018-07-04 DIAGNOSIS — Z98.890 OTHER SPECIFIED POSTPROCEDURAL STATES: Chronic | ICD-10-CM

## 2018-07-04 DIAGNOSIS — D64.9 ANEMIA, UNSPECIFIED: ICD-10-CM

## 2018-07-04 DIAGNOSIS — T84.82XA FIBROSIS DUE TO INTERNAL ORTHOPEDIC PROSTHETIC DEVICES, IMPLANTS AND GRAFTS, INITIAL ENCOUNTER: Chronic | ICD-10-CM

## 2018-07-04 DIAGNOSIS — K57.92 DIVERTICULITIS OF INTESTINE, PART UNSPECIFIED, WITHOUT PERFORATION OR ABSCESS WITHOUT BLEEDING: Chronic | ICD-10-CM

## 2018-08-09 NOTE — PHYSICAL THERAPY INITIAL EVALUATION ADULT - RANGE OF MOTION EXAMINATION, REHAB EVAL
bilateral lower extremity ROM was WFL (within functional limits)/bilateral upper extremity ROM was WFL (within functional limits) PAIN SCALE 7 OF 10.

## 2018-08-20 NOTE — ED BEHAVIORAL HEALTH ASSESSMENT NOTE - JUDGMENT (REGARDING EVERYDAY EVENTS)
Telephoned patient's mother, does not understand why child continues to have GI issues if she is not allergic to anything, will forward message to MD----- Message from Haritha Link sent at 8/20/2018 12:13 PM CDT -----  Contact: [phone]  Type: Test Results    What test was performed? Labs     Who ordered the test?    When and where were the test performed?  08/13    Comments: please call and discuss . Thanks      Poor

## 2019-09-12 ENCOUNTER — OUTPATIENT (OUTPATIENT)
Dept: OUTPATIENT SERVICES | Facility: HOSPITAL | Age: 79
LOS: 1 days | Discharge: HOME | End: 2019-09-12

## 2019-09-12 DIAGNOSIS — D64.9 ANEMIA, UNSPECIFIED: ICD-10-CM

## 2019-09-12 DIAGNOSIS — T84.82XA FIBROSIS DUE TO INTERNAL ORTHOPEDIC PROSTHETIC DEVICES, IMPLANTS AND GRAFTS, INITIAL ENCOUNTER: Chronic | ICD-10-CM

## 2019-09-12 DIAGNOSIS — N39.0 URINARY TRACT INFECTION, SITE NOT SPECIFIED: ICD-10-CM

## 2019-09-12 DIAGNOSIS — Z98.890 OTHER SPECIFIED POSTPROCEDURAL STATES: Chronic | ICD-10-CM

## 2019-09-12 DIAGNOSIS — K57.92 DIVERTICULITIS OF INTESTINE, PART UNSPECIFIED, WITHOUT PERFORATION OR ABSCESS WITHOUT BLEEDING: Chronic | ICD-10-CM

## 2019-09-13 PROBLEM — F03.90 UNSPECIFIED DEMENTIA WITHOUT BEHAVIORAL DISTURBANCE: Chronic | Status: ACTIVE | Noted: 2018-03-10

## 2019-09-13 PROBLEM — E78.00 PURE HYPERCHOLESTEROLEMIA, UNSPECIFIED: Chronic | Status: ACTIVE | Noted: 2018-03-10

## 2019-09-13 PROBLEM — K57.92 DIVERTICULITIS OF INTESTINE, PART UNSPECIFIED, WITHOUT PERFORATION OR ABSCESS WITHOUT BLEEDING: Chronic | Status: ACTIVE | Noted: 2018-03-22

## 2019-09-13 PROBLEM — K21.9 GASTRO-ESOPHAGEAL REFLUX DISEASE WITHOUT ESOPHAGITIS: Chronic | Status: ACTIVE | Noted: 2018-03-22

## 2019-09-13 PROBLEM — I10 ESSENTIAL (PRIMARY) HYPERTENSION: Chronic | Status: ACTIVE | Noted: 2018-03-10

## 2019-09-13 PROBLEM — J44.9 CHRONIC OBSTRUCTIVE PULMONARY DISEASE, UNSPECIFIED: Chronic | Status: ACTIVE | Noted: 2018-03-22

## 2019-11-06 NOTE — ED BEHAVIORAL HEALTH ASSESSMENT NOTE - EYE CONTACT
Bedside shift change report given to Lencho Nicholas RN (oncoming nurse) by Giovany Leon RN (offgoing nurse). Report included the following information SBAR, Kardex and MAR. Fair

## 2019-12-23 ENCOUNTER — OUTPATIENT (OUTPATIENT)
Dept: OUTPATIENT SERVICES | Facility: HOSPITAL | Age: 79
LOS: 1 days | Discharge: HOME | End: 2019-12-23

## 2019-12-23 DIAGNOSIS — T84.82XA FIBROSIS DUE TO INTERNAL ORTHOPEDIC PROSTHETIC DEVICES, IMPLANTS AND GRAFTS, INITIAL ENCOUNTER: Chronic | ICD-10-CM

## 2019-12-23 DIAGNOSIS — K57.92 DIVERTICULITIS OF INTESTINE, PART UNSPECIFIED, WITHOUT PERFORATION OR ABSCESS WITHOUT BLEEDING: Chronic | ICD-10-CM

## 2019-12-23 DIAGNOSIS — Z98.890 OTHER SPECIFIED POSTPROCEDURAL STATES: Chronic | ICD-10-CM

## 2019-12-23 DIAGNOSIS — F06.31 MOOD DISORDER DUE TO KNOWN PHYSIOLOGICAL CONDITION WITH DEPRESSIVE FEATURES: ICD-10-CM

## 2020-10-20 NOTE — PATIENT PROFILE ADULT. - NSSUBSTANCEUSE_GEN_ALL_CORE_SD
Office Consent to Operation/Invasive Procedure    Name: Paula Donahue   YOB: 1968   MRN: [id number]   1. AUTHORIZATION:  I authorize performance on the patient of the following surgical operation/invasive procedure under the direction, supervision and authority of Robby Flores MD.  Description of operation(s): FINE NEEDLE ASPIRATION BIOPSY OF RIGHT THYROID NODULE    2. NATURE OF TREATMENT:  The nature of my condition, the nature and purpose of the operation/procedure, possible alternative methods of treatment, risks involved, and the possibility of complications have been explained to me. I have had an opportunity to discuss this operation/procedure with the physician and other doctors concerned, and I have received answers to all questions I asked and do hereby assume all risks involved. No guarantee or assurance has been given to me by anyone as to the results that may be obtained.     3. ADDITIONAL PROCEDURES:  I consent to the performance of operations and procedures in addition to or different from those now contemplated, whether or not arising from presently unforeseen conditions, which the above-named doctor or his/her associate or assistants may consider necessary or advisable in the course of the operation.    4. OTHER QUALIFIED PRACTITIONERS:  I have been advised, and I agree, that the operation/procedure may be performed by a team of doctors including one or more attending, doctors, residents and medical students. I consent to these other qualified medical practitioners, who are not physicians, performing important parts of the operation/procedure or the administration of anesthetic agents.    5. ANESTHETIC AGENTS: I understand that anesthetic agents may have serious and even fatal complications. I consent to the administration of such anesthetics/sedatives/medications as may be considered necessary or advisable by the physician responsible for the service.      6. OBSERVATION:  For the  purpose of advancing the educational programs of the facility I consent to the admittance of qualified observers.    7. PHOTOGRAPHY: I consent to the taking and publication of photographs and other reproductions in the course of the operation or procedure for the purpose of advancing education provided that the identify of the patient is not revealed.    8. TISSUE USE:  I authorize the facility to preserve and use for scientific or teaching purposes or otherwise dispose of any dismembered tissue resulting from the procedure and treatment authorized above.    9. INDEPENDENT PHYSICIAN SERVICES: I have been informed and I acknowledge and fully understand that the Physician(s) providing services to me in this facility, such as my personal Physician(s),  Specialty Physician(s), Radiology, Pathology, consulting, and other allied health physicians, are independent contractors and are not employees or agents of this facility, unless otherwise specifically stated. My decision to seek medical care at the facility is not based upon any understanding, representation, advertisement, media campaign, inference, implication or reliance that the physicians who are or will be treating me are employees or agents of the facility.     DO NOT SIGN IF YOU HAVE ANY QUESTIONS  ________________________________________________________________________________________________________________  My signature below constitutes my acknowledgement and agreement that I have read and understand the above, was given an opportunity to discuss this form and ask questions, that all questions were answered to my satisfaction, and I am satisfied I understand the form's contents and significance.    Date: __________________ Time: _________________ Signed: ______________________________________________________        Patient/Legally Authorized Representative (Camp[place] appropriate)     Date: __________________   Time: _________________   Signed:  ______________________________________________________  Witness   Certificate of Interpretation:  I certify that I have read the foregoing to the signor hearof in the ____________________________________________________ language.     Date: ________________ Time: __________________ Signed: _________________________________________________________     Affirmation by Responsible Physician  I have informed the above named patient or Legally Authorized Representative of the medical condition requiring surgical treatment and/or the further diagnostic procedures referred to above. I have explained, consistent with accepted medical judgment, the nature and purposes of the treatment or procedures, the reasonable (1) possible alternatives (2) risks/complications and (3) consequences in the treatment or procedure consented to. I have also given the opportunity to ask questions and have answered any such questions.     Date:________________ Time: __________________ Signed: _________________________________________________________      unable to obtain

## 2021-08-19 NOTE — PHYSICAL THERAPY INITIAL EVALUATION ADULT - IMPAIRMENTS CONTRIBUTING TO GAIT DEVIATIONS, PT EVAL
impaired coordination/decreased strength/impaired balance/decreased flexibility Humira Counseling:  I discussed with the patient the risks of adalimumab including but not limited to myelosuppression, immunosuppression, autoimmune hepatitis, demyelinating diseases, lymphoma, and serious infections.  The patient understands that monitoring is required including a PPD at baseline and must alert us or the primary physician if symptoms of infection or other concerning signs are noted.

## 2021-10-06 NOTE — ED PROVIDER NOTE - NS ED ROS FT
[employer]X [employer] Eyes:  No visual changes, eye pain or discharge.  ENMT:  No hearing changes, pain, discharge or infections. No neck pain or stiffness.  Cardiac:  No chest pain, SOB or edema. No chest pain with exertion.  Respiratory:  No cough or respiratory distress. No hemoptysis. No history of asthma or RAD.  GI:  No nausea, vomiting, diarrhea or abdominal pain.  MS:  No myalgia, muscle weakness, joint pain or back pain.  Neuro:  No headache or weakness.  No LOC.  Skin:  No skin rash.   Endocrine: No history of thyroid disease or diabetes.  Except as documented in the HPI,  all other systems are negative.

## 2022-09-20 NOTE — PATIENT PROFILE ADULT. - PAIN CHRONIC, PROFILE
Subjective: Caller states \"is dizzy, off balance, blurry vision and some numbness in right arm\"     Current Symptoms: numbness is not new. Will be off and on and will be seeing someone in November for this. New this morning is:  Dizziness to point needs to hold on, very off balance  No vomiting. Blurry vision  Headache    Onset: this morning     Associated Symptoms: NA    Pain Severity: 7/10 for headache    Temperature:      What has been tried:     LMP: NA Pregnant: NA    Recommended disposition: Go to ED/UCC Now (Or to Office with PCP Approval)    Care advice provided, patient verbalizes understanding; denies any other questions or concerns; instructed to call back for any new or worsening symptoms. Patient/caller agrees to proceed to [facility] Emergency Department    Attention Provider: Thank you for allowing me to participate in the care of your patient. The patient was connected to triage in response to symptoms provided. Please do not respond through this encounter as the response is not directed to a shared pool.       Reason for Disposition   SEVERE dizziness (e.g., unable to stand, requires support to walk, feels like passing out now)    Protocols used: Dizziness-ADULT-[place] no

## 2023-06-26 NOTE — ED PROVIDER NOTE - NS ED ROS FT
Cont microzide. Review of Systems    Constitutional: (-) fever/ chills (-) weight loss  Eyes/ENT: (-) blurry vision, (-) epistaxis (-) sore throat (-) ear pain  Cardiovascular: (-) chest pain, (-) syncope (-) palpitations  Respiratory: (-) cough, (-) shortness of breath  Musculoskeletal: (-) neck pain, (-) back pain, (-) joint pain (+) pedal edema (+) bruising to the left foot  Integumentary: (-) rash, (-) swelling  Neurological: (-) headache, (-) altered mental status  Psychiatric: (-) hallucinations or depression   Allergic/Immunologic: (-) pruritus

## 2023-07-19 ENCOUNTER — APPOINTMENT (OUTPATIENT)
Dept: ORTHOPEDIC SURGERY | Facility: CLINIC | Age: 83
End: 2023-07-19
Payer: MEDICARE

## 2023-07-19 PROCEDURE — 99203 OFFICE O/P NEW LOW 30 MIN: CPT | Mod: 25

## 2023-07-19 PROCEDURE — 29075 APPL CST ELBW FNGR SHORT ARM: CPT | Mod: LT

## 2023-07-19 PROCEDURE — 73140 X-RAY EXAM OF FINGER(S): CPT | Mod: LT

## 2023-07-19 NOTE — IMAGING
[de-identified] : X-rays taken of the patient's left fifth finger in office today revealed a displaced/comminuted fracture noted at the proximal phalanx of the left little finger.  Arthritic changes also noted.  Otherwise, no other significant abnormalities were seen.\par \par Post cast x-rays taken of the patient's left fifth finger in office today revealed a slightly better alignment of her fracture.
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
## 2023-07-19 NOTE — PHYSICAL EXAM
[Left] : left hand [Dorsal Hand] : dorsal hand [MCP Joint] : MCP joint [Proximal Phalanx] : proximal phalanx [5th] : 5th [MP Joint] : MP joint [PIP Joint] : PIP joint [Finger Flexion] : finger flexion [Finger Extension] : finger extension [4___] : pinch 4[unfilled]/5 [] : no instability w/varus/valgus or ant/post stressing of fingers joints

## 2023-07-19 NOTE — DISCUSSION/SUMMARY
[de-identified] : With the patient's approval, and under sterile technique, I performed a digital block by injecting 5 cc of lidocaine towards the fracture site.  She tolerated the procedure well.  She was then placed in a well-fitted and well molded fiberglass dorsal blocking cast with attempted reduction.\par \par Advised elevation to help with swelling.  Encourage range of motion of other fingers while in cast.  Instructed not to get the cast wet. Explained possible finger deformity and lack of extensor lag in future due to nature of fracture.\par \par The patient will follow-up in 3 weeks for repeat x-rays and likely cast off.  All of the patient's questions/concerns were answered in detail.\par \par

## 2023-08-09 ENCOUNTER — APPOINTMENT (OUTPATIENT)
Dept: ORTHOPEDIC SURGERY | Facility: CLINIC | Age: 83
End: 2023-08-09
Payer: MEDICARE

## 2023-08-09 DIAGNOSIS — S62.617A DISPLACED FRACTURE OF PROXIMAL PHALANX OF LEFT LITTLE FINGER, INITIAL ENCOUNTER FOR CLOSED FRACTURE: ICD-10-CM

## 2023-08-09 PROCEDURE — 73140 X-RAY EXAM OF FINGER(S): CPT | Mod: LT

## 2023-08-09 PROCEDURE — 99202 OFFICE O/P NEW SF 15 MIN: CPT | Mod: 25
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
## 2023-12-15 NOTE — ED ADULT NURSE NOTE - OBJECTIVE STATEMENT
no As per EMS patient was found by her boyfriend to be altered mentally, patient was last seen well walking to the bathroom at 5 AM.

## 2024-05-31 NOTE — ED ADULT NURSE NOTE - NSSISCREENINGQ1_ED_A_ED
Medication Management Service, Warfarin Management  [facility] Medication Management, [phone]  Visit Date: 6/3/2024   Subjective:   Cheyanne Atkinson is a 50 y.o. female who presents to clinic today for anticoagulation monitoring and adjustment.    Patient was referred for warfarin management due to  Indication:   DVT.   INR goal: of 2.0-3.0.  Duration of therapy: indefinite.    Patient reports the following:   Adherent with regimen:  Yes  Missed or extra doses:  None   Bleeding or thromboembolic side effects:  None    Significant medication, dietary, alcohol, or tobacco changes:  None - patient is still stressed/eating how she was since last appointment due to taking care of her mom and all her mom's appointments    Significant recent illness, disease state changes, or hospitalization:  None  Upcoming surgeries or procedures:  None           Assessment and PLAN   PT/INR done in office per protocol.     INR today is 2.6, therapeutic.    Plan:  Instructed the patient to continue the current warfarin regimen of 5 mg Mon, Wed, Fri, and 2.5 mg on all other days.  Using warfarin 5 mg tablets.    Recheck INR in 4 week(s).     Patient verbalized understanding of dosing directions and information discussed. Dosing schedule given to patient. Progress note sent to referring office.  Patient acknowledges working in consult agreement with pharmacist as referred by his/her physician.      Electronically signed by Sailaja Bonilla RPH on 24 at 3:23 PM EDT    For Pharmacy Admin Tracking Only    Intervention Detail: Adherence Monitorin  Total # of Interventions Recommended: 1  Total # of Interventions Accepted: 1  Time Spent (min): 20    
No

## 2024-06-09 NOTE — ED ADULT NURSE NOTE - NS ED NURSE RECORD ANOTHER VITAL SIGN
Yes

## 2025-02-06 NOTE — PROGRESS NOTE ADULT - SUBJECTIVE AND OBJECTIVE BOX
SUBJECTIVE:    Patient is a 77y old Female who presents with a chief complaint of worsening dementia, family member unable to take care of her (22 Mar 2018 16:36)    Currently admitted to medicine with the primary diagnosis of advanced dementia.     Today is hospital day 5d. This morning she is resting comfortably in bed. Complaining of mild chest pain. EKG unchanged.    PAST MEDICAL & SURGICAL HISTORY  Diverticulitis  GERD (gastroesophageal reflux disease)  COPD (chronic obstructive pulmonary disease)  Dementia  Hypercholesterolemia  High blood pressure  History of cholecystectomy  History of colon resection  Acute diverticulitis  Arthrofibrosis of total knee replacement    SOCIAL HISTORY:  Negative for smoking/alcohol/drug use.     ALLERGIES:  aspirin (Unknown)  penicillins (Unknown)    MEDICATIONS:  STANDING MEDICATIONS  atorvastatin 80 milliGRAM(s) Oral at bedtime  donepezil 10 milliGRAM(s) Oral at bedtime  DULoxetine 60 milliGRAM(s) Oral daily  gabapentin 100 milliGRAM(s) Oral three times a day  heparin  Injectable 5000 Unit(s) SubCutaneous every 8 hours  hydrOXYzine hydrochloride 25 milliGRAM(s) Oral every 6 hours  memantine 10 milliGRAM(s) Oral two times a day  OLANZapine 7.5 milliGRAM(s) Oral at bedtime  pantoprazole    Tablet 40 milliGRAM(s) Oral before breakfast    PRN MEDICATIONS  acetaminophen   Tablet. 650 milliGRAM(s) Oral every 6 hours PRN  clonazePAM Tablet 0.5 milliGRAM(s) Oral every 6 hours PRN    VITALS:   T(F): 97.1  HR: 73  BP: 139/59  RR: 18  SpO2: 100%    LABS:    03-26    145  |  107  |  21<H>  ----------------------------<  90  3.2<L>   |  26  |  0.7    Ca    8.5      26 Mar 2018 09:21    CARDIAC MARKERS ( 25 Mar 2018 13:28 )  x     / x     / 162 U/L / x     / x          RADIOLOGY:    no new imaging    PHYSICAL EXAM:  GEN: No acute distress  LUNGS: Clear to auscultation bilaterally   HEART: S1/S2 present. RRR.   ABD: Soft, non-tender, non-distended. Bowel sounds present  EXT: NC/NC/NE/2+PP/ANAYA  NEURO: AAOX2 operating room